# Patient Record
Sex: FEMALE | Race: ASIAN | Employment: FULL TIME | URBAN - METROPOLITAN AREA
[De-identification: names, ages, dates, MRNs, and addresses within clinical notes are randomized per-mention and may not be internally consistent; named-entity substitution may affect disease eponyms.]

---

## 2017-08-08 ENCOUNTER — OFFICE VISIT (OUTPATIENT)
Dept: INTERNAL MEDICINE CLINIC | Facility: CLINIC | Age: 27
End: 2017-08-08

## 2017-08-21 ENCOUNTER — OFFICE VISIT (OUTPATIENT)
Dept: INTERNAL MEDICINE CLINIC | Facility: CLINIC | Age: 27
End: 2017-08-21

## 2017-08-21 VITALS
SYSTOLIC BLOOD PRESSURE: 106 MMHG | TEMPERATURE: 97 F | HEART RATE: 53 BPM | WEIGHT: 155 LBS | RESPIRATION RATE: 18 BRPM | HEIGHT: 66 IN | BODY MASS INDEX: 24.91 KG/M2 | DIASTOLIC BLOOD PRESSURE: 59 MMHG

## 2017-08-21 DIAGNOSIS — Z11.3 SCREEN FOR STD (SEXUALLY TRANSMITTED DISEASE): ICD-10-CM

## 2017-08-21 DIAGNOSIS — Z00.00 ENCOUNTER FOR GENERAL ADULT MEDICAL EXAMINATION W/O ABNORMAL FINDINGS: Primary | ICD-10-CM

## 2017-08-21 NOTE — MR AVS SNAPSHOT
Visit Information Date & Time Provider Department Dept. Phone Encounter #  
 8/21/2017  8:30 AM Romario Rea NP [facility] Internal Medicine [phone] [id number] Follow-up Instructions Return for As needed. Upcoming Health Maintenance Date Due INFLUENZA AGE 9 TO ADULT 8/1/2017 PAP AKA CERVICAL CYTOLOGY 1/11/2019 DTaP/Tdap/Td series (3 - Td) 8/16/2026 Allergies as of 8/21/2017  Review Complete On: 8/21/2017 By: Romario Rea NP No Known Allergies Current Immunizations  Reviewed on 8/16/2016 Name Date HPV 7/14/2009 HPV (Quad) 8/16/2016 Tdap 8/16/2016, 7/14/2009 Not reviewed this visit You Were Diagnosed With   
  
 Codes Comments Encounter for general adult medical examination w/o abnormal findings    -  Primary ICD-10-CM: Z00.00 ICD-9-CM: V70.9 Screen for STD (sexually transmitted disease)     ICD-10-CM: Z11.3 ICD-9-CM: V74.5 Vitals BP Pulse Temp Resp Height(growth percentile) Weight(growth percentile) 106/59 (!) 53 97 °F (36.1 °C) (Oral) 18 5' 6\" (1.676 m) 155 lb (70.3 kg) LMP BMI OB Status Smoking Status 08/11/2017 (Approximate) 25.02 kg/m2 Implant Never Smoker Vitals History BMI and BSA Data Body Mass Index Body Surface Area 25.02 kg/m 2 1.81 m 2 Preferred Pharmacy Pharmacy Name Phone [facility] [id number] [place] [facility] [place] [address] [phone] Your Updated Medication List  
  
   
This list is accurate as of: 8/21/17  8:45 AM.  Always use your most recent med list.  
  
  
  
  
 cholecalciferol 1,000 unit tablet Commonly known as:  VITAMIN D3 Take 2 Tabs by mouth daily. Indications: VITAMIN D DEFICIENCY  
  
 etonogestrel 68 mg Impl  
by SubDERmal route. We Performed the Following CBC WITH AUTOMATED DIFF [34998 CPT(R)] CT/NG/T.VAGINALIS AMPLIFICATION [id number] CPT(R)] HIV 1/2 AG/AB, 4TH GENERATION,W RFLX CONFIRM [[id number] Custom] LIPID PANEL [44401 CPT(R)] METABOLIC PANEL, COMPREHENSIVE [82821 CPT(R)] RPR [56259 CPT(R)] VITAMIN D, 25 HYDROXY [id number] CPT(R)] Follow-up Instructions Return for As needed. Patient Instructions Use claritin,allegra, or zrytec to see if that helps your voice. You can also try Flonase or nasonex (nasal sprays) Hoarseness: Care Instructions Your Care Instructions Many things can cause your voice to become rough, raspy, or hard to hear. Having a cold or a sinus infection, talking too loudly or yelling, smoking, or breathing dry air can cause a hoarse voice. You also can have voice problems from pollution and allergies. Sometimes, acid from your stomach can back up into your throatcalled acid refluxand change your voice. In some cases, a problem with the voice box, or larynx, causes hoarseness. Rest and home care may be all you need if you have a hoarse voice. Follow-up care is a key part of your treatment and safety. Be sure to make and go to all appointments, and call your doctor if you are having problems. It's also a good idea to know your test results and keep a list of the medicines you take. How can you care for yourself at home? · Follow your doctor's advice about how much to talk. Use email or write notes when you can to rest your voice. · If your doctor prescribed antibiotics, take them as directed. Do not stop taking them just because you feel better. You need to take the full course of antibiotics. · Talk to your doctor about treatment for allergies if you do not already treat them. · Follow your treatment plan for acid reflux (if you have the condition): ¨ Take your medicines exactly as prescribed. Call your doctor if you think you are having a problem with your medicine. ¨ Limit or stop eating foods that make your acid reflux worse. These may include tomatoes, spicy foods, and chocolate. ¨ Limit or stop drinking alcohol and drinks that have caffeine, such as coffee, tea, and ronaldo. ¨ Raise the head of your bed a few inches. Place a brick or a foam wedge under the mattress at the head of the bed. This will help keep stomach acid out of your throat at night. · When you do talk, do not whisper. It can be hard on your voice. · Use a vaporizer or humidifier to add moisture to your bedroom. Follow the directions for cleaning the machine. · Drink plenty of water to keep your throat moist. If you have kidney, heart, or liver disease and have to limit fluids, talk with your doctor before you increase the amount of fluids you drink. · Do not smoke. Smoking can make your voice raspy and can increase your risk of throat cancer. If you need help quitting, talk to your doctor about stop-smoking programs and medicines. These can increase your chances of quitting for good. · To keep your voice from getting hoarse in the future, try not to talk loudly or shout, such as at sports events. When should you call for help? Call 911 anytime you think you may need emergency care. For example, call if: 
· You have trouble breathing. Call your doctor now or seek immediate medical care if: 
· You have trouble swallowing. · You have new or worse pain. Watch closely for changes in your health, and be sure to contact your doctor if: 
· You do not get better as expected. Where can you learn more? Go to http://juliocesar-alberto.info/. Enter P454 in the search box to learn more about \"Hoarseness: Care Instructions. \" Current as of: March 20, 2017 Content Version: 11.3 © 6724-8986 [employer]. Care instructions adapted under license by [employer] (which disclaims liability or warranty for this information).  If you have questions about a medical condition or this instruction, always ask your healthcare professional. [employer] disclaims any warranty or liability for your use of this information. Introducing [facility]! Dear Ric Dorsey: Thank you for requesting a [employer] account. Our records indicate that you already have an active [employer] account. You can access your account anytime at [email or web address]/[email or web address] Did you know that you can access your hospital and ER discharge instructions at any time in [employer]? You can also review all of your test results from your hospital stay or ER visit. Additional Information If you have questions, please visit the Frequently Asked Questions section of the [employer] website at [email or web address] Remember, [employer] is NOT to be used for urgent needs. For medical emergencies, dial 911. Now available from your iPhone and Android! Please provide this summary of care documentation to your next provider. Your primary care clinician is listed as Lonell . If you have any questions after today's visit, please call [phone].

## 2017-08-21 NOTE — PATIENT INSTRUCTIONS
Use claritin,allegra, or zrytec to see if that helps your voice. You can also try Flonase or nasonex (nasal sprays)     Hoarseness: Care Instructions  Your Care Instructions  Many things can cause your voice to become rough, raspy, or hard to hear. Having a cold or a sinus infection, talking too loudly or yelling, smoking, or breathing dry air can cause a hoarse voice. You also can have voice problems from pollution and allergies. Sometimes, acid from your stomach can back up into your throat--called acid reflux--and change your voice. In some cases, a problem with the voice box, or larynx, causes hoarseness. Rest and home care may be all you need if you have a hoarse voice. Follow-up care is a key part of your treatment and safety. Be sure to make and go to all appointments, and call your doctor if you are having problems. It's also a good idea to know your test results and keep a list of the medicines you take. How can you care for yourself at home? · Follow your doctor's advice about how much to talk. Use email or write notes when you can to rest your voice. · If your doctor prescribed antibiotics, take them as directed. Do not stop taking them just because you feel better. You need to take the full course of antibiotics. · Talk to your doctor about treatment for allergies if you do not already treat them. · Follow your treatment plan for acid reflux (if you have the condition): ¨ Take your medicines exactly as prescribed. Call your doctor if you think you are having a problem with your medicine. ¨ Limit or stop eating foods that make your acid reflux worse. These may include tomatoes, spicy foods, and chocolate. ¨ Limit or stop drinking alcohol and drinks that have caffeine, such as coffee, tea, and ronaldo. ¨ Raise the head of your bed a few inches. Place a brick or a foam wedge under the mattress at the head of the bed. This will help keep stomach acid out of your throat at night.   · When you do talk, do not whisper. It can be hard on your voice. · Use a vaporizer or humidifier to add moisture to your bedroom. Follow the directions for cleaning the machine. · Drink plenty of water to keep your throat moist. If you have kidney, heart, or liver disease and have to limit fluids, talk with your doctor before you increase the amount of fluids you drink. · Do not smoke. Smoking can make your voice raspy and can increase your risk of throat cancer. If you need help quitting, talk to your doctor about stop-smoking programs and medicines. These can increase your chances of quitting for good. · To keep your voice from getting hoarse in the future, try not to talk loudly or shout, such as at sports events. When should you call for help? Call 911 anytime you think you may need emergency care. For example, call if:  · You have trouble breathing. Call your doctor now or seek immediate medical care if:  · You have trouble swallowing. · You have new or worse pain. Watch closely for changes in your health, and be sure to contact your doctor if:  · You do not get better as expected. Where can you learn more? Go to http://juliocesar-alberto.info/. Enter P454 in the search box to learn more about \"Hoarseness: Care Instructions. \"  Current as of: March 20, 2017  Content Version: 11.3  © 3128-6504 [employer]. Care instructions adapted under license by [employer] (which disclaims liability or warranty for this information). If you have questions about a medical condition or this instruction, always ask your healthcare professional. [place] [address] any warranty or liability for your use of this information.

## 2017-08-21 NOTE — PROGRESS NOTES
Subjective:      Hanna Owens is a 23 y.o. female who presents today for CPE with fasting labs. Health Maintenance  Immunizations:    Influenza: up to date. Tetanus: up to date. Gardasil: up to date. Cancer screening:    Cervical: reviewed guidelines, UTD. Breast: reviewed guidelines, UTD, done by OBGYN, records requested    Patient Care Team:  Ghislaine Campa NP as PCP - General (Nurse Practitioner)       The following sections were reviewed & updated as appropriate: PMH, PSH, FH, and SH. Patient Active Problem List   Diagnosis Code    Implanon in place Z97.5    Vitamin D deficiency E55.9    Elevated platelet count ([facility] [address] D47.3      Prior to Admission medications    Medication Sig Start Date End Date Taking? Authorizing Provider   etonogestrel 68 mg impl by SubDERmal route. Yes Historical Provider   cholecalciferol (VITAMIN D3) 1,000 unit tablet Take 2 Tabs by mouth daily. Indications: VITAMIN D DEFICIENCY 8/22/16   Ghislaine Campa NP      No Known Allergies     Family History   Problem Relation Age of Onset    Hypertension Mother      Social History   Substance Use Topics    Smoking status: Never Smoker    Smokeless tobacco: Never Used    Alcohol use 2.4 oz/week     4 Cans of beer per week      Comment: weekly        Review of Systems    A comprehensive review of systems was negative except for that written in the HPI. Objective:     Visit Vitals    Ht 5' 6\" (1.676 m)    Wt 155 lb (70.3 kg)    LMP 08/11/2017 (Approximate)    BMI 25.02 kg/m2     General:  Alert, cooperative, no distress, appears stated age. Head:  Normocephalic, without obvious abnormality, atraumatic. Eyes:  Conjunctivae/corneas clear. PERRL, EOMs intact. Fundi benign. Ears:  Normal TMs and external ear canals both ears. Nose: Nares normal. Septum midline. Mucosa normal. No drainage or sinus tenderness.    Throat: Lips, mucosa, and tongue normal. Teeth and gums normal.   Neck: Supple, symmetrical, trachea midline, no adenopathy, thyroid: no enlargement/tenderness/nodules, no carotid bruit and no JVD. Back:   Symmetric, no curvature. ROM normal. No CVA tenderness. Lungs:   Clear to auscultation bilaterally. Chest wall:  No tenderness or deformity. Heart:  Regular rate and rhythm, S1, S2 normal, no murmur, click, rub or gallop. Abdomen:   Soft, non-tender. Bowel sounds normal. No masses,  No organomegaly. Extremities: Extremities normal, atraumatic, no cyanosis or edema. Pulses: 2+ and symmetric all extremities. Skin: Skin color, texture, turgor normal. No rashes or lesions. Lymph nodes: Cervical, supraclavicular, and axillary nodes normal.   Neurologic: CNII-XII intact. Normal strength, sensation and reflexes throughout. Nursing note and vitals reviewed  Assessment/Plan:       ICD-10-CM ICD-9-CM    1. Encounter for general adult medical examination w/o abnormal findings Z00.00 V70.9 CBC WITH AUTOMATED DIFF      LIPID PANEL      METABOLIC PANEL, COMPREHENSIVE      VITAMIN D, 25 HYDROXY   2. Screen for STD (sexually transmitted disease) Z11.3 V74.5 HIV 1/2 AG/AB, 4TH GENERATION,W RFLX CONFIRM      RPR      CT/NG/T.VAGINALIS AMPLIFICATION     Follow-up Disposition:  Return for As needed. Advised her to call back or return to office if symptoms worsen/change/persist.  Discussed expected course/resolution/complications of diagnosis in detail with patient. Medication risks/benefits/costs/interactions/alternatives discussed with patient. She was given an after visit summary which includes diagnoses, current medications, & vitals. She expressed understanding with the diagnosis and plan.

## 2017-08-21 NOTE — PROGRESS NOTES
Chief Complaint   Patient presents with    Physical     1. Have you been to the ER, urgent care clinic since your last visit? Hospitalized since your last visit? No    2. Have you seen or consulted any other health care providers outside of the [address] [place] [address] since your last visit? Include any pap smears or colon screening.  No

## 2017-08-23 DIAGNOSIS — E55.9 VITAMIN D DEFICIENCY: Primary | ICD-10-CM

## 2017-08-23 LAB
25(OH)D3+25(OH)D2 SERPL-MCNC: 21.4 NG/ML (ref 30–100)
ALBUMIN SERPL-MCNC: 4.4 G/DL (ref 3.5–5.5)
ALBUMIN/GLOB SERPL: 1.7 {RATIO} (ref 1.2–2.2)
ALP SERPL-CCNC: 43 IU/L (ref 39–117)
ALT SERPL-CCNC: 9 IU/L (ref 0–32)
AST SERPL-CCNC: 15 IU/L (ref 0–40)
BASOPHILS # BLD AUTO: 0 X10E3/UL (ref 0–0.2)
BASOPHILS NFR BLD AUTO: 1 %
BILIRUB SERPL-MCNC: 0.8 MG/DL (ref 0–1.2)
BUN SERPL-MCNC: 12 MG/DL (ref 6–20)
BUN/CREAT SERPL: 18 (ref 9–23)
C TRACH RRNA SPEC QL NAA+PROBE: NEGATIVE
CALCIUM SERPL-MCNC: 9.5 MG/DL (ref 8.7–10.2)
CHLORIDE SERPL-SCNC: 100 MMOL/L (ref 96–106)
CHOLEST SERPL-MCNC: 159 MG/DL (ref 100–199)
CO2 SERPL-SCNC: 25 MMOL/L (ref 18–29)
CREAT SERPL-MCNC: 0.67 MG/DL (ref 0.57–1)
EOSINOPHIL # BLD AUTO: 0.4 X10E3/UL (ref 0–0.4)
EOSINOPHIL NFR BLD AUTO: 8 %
ERYTHROCYTE [DISTWIDTH] IN BLOOD BY AUTOMATED COUNT: 13.1 % (ref 12.3–15.4)
GLOBULIN SER CALC-MCNC: 2.6 G/DL (ref 1.5–4.5)
GLUCOSE SERPL-MCNC: 87 MG/DL (ref 65–99)
HCT VFR BLD AUTO: 44.7 % (ref 34–46.6)
HDLC SERPL-MCNC: 82 MG/DL
HGB BLD-MCNC: 14.4 G/DL (ref 11.1–15.9)
HIV 1+2 AB+HIV1 P24 AG SERPL QL IA: NON REACTIVE
IMM GRANULOCYTES # BLD: 0 X10E3/UL (ref 0–0.1)
IMM GRANULOCYTES NFR BLD: 0 %
LDLC SERPL CALC-MCNC: 59 MG/DL (ref 0–99)
LYMPHOCYTES # BLD AUTO: 2 X10E3/UL (ref 0.7–3.1)
LYMPHOCYTES NFR BLD AUTO: 41 %
MCH RBC QN AUTO: 29.7 PG (ref 26.6–33)
MCHC RBC AUTO-ENTMCNC: 32.2 G/DL (ref 31.5–35.7)
MCV RBC AUTO: 92 FL (ref 79–97)
MONOCYTES # BLD AUTO: 0.4 X10E3/UL (ref 0.1–0.9)
MONOCYTES NFR BLD AUTO: 9 %
N GONORRHOEA RRNA SPEC QL NAA+PROBE: NEGATIVE
NEUTROPHILS # BLD AUTO: 2 X10E3/UL (ref 1.4–7)
NEUTROPHILS NFR BLD AUTO: 41 %
PLATELET # BLD AUTO: 381 X10E3/UL (ref 150–379)
POTASSIUM SERPL-SCNC: 4.9 MMOL/L (ref 3.5–5.2)
PROT SERPL-MCNC: 7 G/DL (ref 6–8.5)
RBC # BLD AUTO: 4.85 X10E6/UL (ref 3.77–5.28)
RPR SER QL: NON REACTIVE
SODIUM SERPL-SCNC: 140 MMOL/L (ref 134–144)
T VAGINALIS RRNA SPEC QL NAA+PROBE: NEGATIVE
TRIGL SERPL-MCNC: 90 MG/DL (ref 0–149)
VLDLC SERPL CALC-MCNC: 18 MG/DL (ref 5–40)
WBC # BLD AUTO: 4.9 X10E3/UL (ref 3.4–10.8)

## 2017-08-23 RX ORDER — CHOLECALCIFEROL TAB 125 MCG (5000 UNIT) 125 MCG
5000 TAB ORAL DAILY
Qty: 30 TAB | Refills: 11 | Status: SHIPPED | OUTPATIENT
Start: 2017-08-23

## 2017-12-11 ENCOUNTER — OFFICE VISIT (OUTPATIENT)
Dept: INTERNAL MEDICINE CLINIC | Facility: CLINIC | Age: 27
End: 2017-12-11

## 2017-12-11 VITALS
TEMPERATURE: 98 F | SYSTOLIC BLOOD PRESSURE: 98 MMHG | HEIGHT: 66 IN | DIASTOLIC BLOOD PRESSURE: 66 MMHG | OXYGEN SATURATION: 100 % | HEART RATE: 68 BPM | WEIGHT: 161 LBS | BODY MASS INDEX: 25.88 KG/M2 | RESPIRATION RATE: 16 BRPM

## 2017-12-11 DIAGNOSIS — R41.840 CONCENTRATION DEFICIT: Primary | ICD-10-CM

## 2017-12-11 RX ORDER — MISOPROSTOL 200 UG/1
TABLET ORAL
Refills: 0 | COMMUNITY
Start: 2017-10-26 | End: 2018-06-25 | Stop reason: ALTCHOICE

## 2017-12-11 RX ORDER — BUPROPION HYDROCHLORIDE 150 MG/1
150 TABLET ORAL
Qty: 30 TAB | Refills: 0 | Status: SHIPPED | OUTPATIENT
Start: 2017-12-11 | End: 2018-01-07 | Stop reason: SDUPTHER

## 2017-12-11 NOTE — MR AVS SNAPSHOT
Visit Information Date & Time Provider Department Dept. Phone Encounter #  
 12/11/2017  4:00 PM Tess Peter, [place] [address] Internal Medicine [phone] [id number] Follow-up Instructions Return in about 4 weeks (around 1/8/2018) for concentration deficit, follow up in 4 weeks if needed. Upcoming Health Maintenance Date Due  
 PAP AKA CERVICAL CYTOLOGY 1/11/2019 DTaP/Tdap/Td series (3 - Td) 8/16/2026 Allergies as of 12/11/2017  Review Complete On: 12/11/2017 By: Tess Peter NP No Known Allergies Current Immunizations  Reviewed on 8/16/2016 Name Date HPV 7/14/2009 HPV (Quad) 8/16/2016 Influenza Vaccine 10/26/2017 Tdap 8/16/2016, 7/14/2009 Not reviewed this visit You Were Diagnosed With   
  
 Codes Comments Concentration deficit    -  Primary ICD-10-CM: R41.840 ICD-9-CM: 799.51 Vitals BP Pulse Temp Resp Height(growth percentile) Weight(growth percentile) 98/66 (BP 1 Location: Left arm, BP Patient Position: Sitting) 68 98 °F (36.7 °C) (Oral) 16 5' 6\" (1.676 m) 161 lb (73 kg) LMP SpO2 BMI OB Status Smoking Status 11/15/2017 100% 25.99 kg/m2 IUD Never Smoker Vitals History BMI and BSA Data Body Mass Index Body Surface Area  
 25.99 kg/m 2 1.84 m 2 Preferred Pharmacy Pharmacy Name Phone [id number] - [place]en Touss[place], [address] [facility] [phone] Your Updated Medication List  
  
   
This list is accurate as of: 12/11/17  4:16 PM.  Always use your most recent med list.  
  
  
  
  
 buPROPion  mg tablet Commonly known as:  [place] Take 1 Tab by mouth every morning. cholecalciferol (VITAMIN D3) 5,000 unit Tab tablet Commonly known as:  VITAMIN D3 Take 1 Tab by mouth daily. miSOPROStol 200 mcg tablet Commonly known as:  CYTOTEC PLACE ONE TAB VAGINALLY THE NIGHT PRIOR TO SURGERY Prescriptions Sent to Pharmacy Refills buPROPion XL (WELLBUTRIN XL) 150 mg tablet 0 Sig: Take 1 Tab by mouth every morning. Class: Normal  
 Pharmacy: [facility] [address] [address] #: [phone] Route: Oral  
  
We Performed the Following REFERRAL TO NEUROPSYCHOLOGY [AWO37 Custom] Comments:  
 Please evaluate patient for concentration deficit. Follow-up Instructions Return in about 4 weeks (around 1/8/2018) for concentration deficit, follow up in 4 weeks if needed. Referral Information Referral ID Referred By Referred To  
  
 [id number] [facility], [address] [facility] [place] [address] [facility], [address] [facility] Phone: [phone] Fax: [phone] Visits Status Start Date End Date 1 New Request 12/11/17 12/11/18 If your referral has a status of pending review or denied, additional information will be sent to support the outcome of this decision. Patient Instructions Bupropion (By mouth) Bupropion (eok-OHKG-wti-on) Treats depression and aids in quitting smoking. Also prevents depression caused by seasonal affective disorder (SAD). Brand Name(s): Aplenzin, Forfivo XL, Wellbutrin, Wellbutrin SR, Wellbutrin XL, Zyban There may be other brand names for this medicine. When This Medicine Should Not Be Used: This medicine is not right for everyone. Do not use it if you had an allergic reaction to bupropion, or if you have seizures, anorexia, or bulimia. How to Use This Medicine:  
Tablet, Long Acting Tablet · Take your medicine as directed. Your dose may need to be changed several times to find what works best for you. · You may need to take Wellbutrin® for up to 4 weeks before you feel better. You may need to take Zyban® for 1 to 2 weeks before the date that you plan to stop smoking. · Swallow the extended-release tablet whole. Do not crush, break, or chew it. · It is best to take Aplenzin® in the morning. · Do not take Wellbutrin® or Zyban® close to bedtime if you have trouble sleeping. · Take it with food if it upsets your stomach or if you have nausea. · If you take the extended-release tablet, part of the tablet may pass into your stools. This is normal and is nothing to worry about. · This medicine should come with a Medication Guide. Ask your pharmacist for a copy if you do not have one. · Missed dose: Skip the missed dose and go back to your regular dosing schedule. Never take extra medicine to make up for a missed dose. · Store the medicine in a closed container at room temperature, away from heat, moisture, and direct light. Drugs and Foods to Avoid: Ask your doctor or pharmacist before using any other medicine, including over-the-counter medicines, vitamins, and herbal products. · Do not use this medicine and an MAO inhibitor (MAOI) within 14 days of each other. Do not use Zyban® to quit smoking if you already take Aplenzin® or Wellbutrin® for depression, because they are the same medicine. · Tell your doctor if you take barbiturates, benzodiazepines, antiseizure medicine, or sedatives, or if you recently stopped taking them. · Some medicines can affect how bupropion works. Tell your doctor if you use any of the following: ¨ Amantadine, carbamazepine, cimetidine, clopidogrel, cyclophosphamide, digoxin, efavirenz, levodopa, lopinavir, nelfinavir, nicotine patch, orphenadrine, phenobarbital, phenytoin, ritonavir, tamoxifen, theophylline, thiotepa, ticlopidine ¨ Beta blocker medicine (including metoprolol) ¨ A blood thinner (including warfarin) ¨ Insulin or diabetes medicine ¨ Medicine to treat depression (including desipramine, fluoxetine, imipramine, nortriptyline, paroxetine, sertraline, venlafaxine) ¨ Medicine to treat heart rhythm problems (including flecainide, propafenone) ¨ Medicine to treat mental illness (including haloperidol, risperidone, thioridazine) ¨ Steroid medicine (including dexamethasone, hydrocortisone, methylprednisolone, prednisolone, prednisone) · Do not drink alcohol while you are using this medicine. · Tell your doctor if you use anything else that makes you sleepy. Some examples are allergy medicine, narcotic pain medicine, and alcohol. Warnings While Using This Medicine: · Tell your doctor if you are pregnant or breastfeeding, or if you have kidney disease, liver disease, heart disease, diabetes, glaucoma, mental illness (including bipolar disorder), or high blood pressure. Tell your doctor if you have a history of drug addiction or if you drink alcohol. · For some children, teenagers, and young adults, this medicine may increase mental or emotional problems. This may lead to thoughts of suicide and violence. Talk with your doctor right away if you have any thoughts or behavior changes that concern you. Tell your doctor if you or anyone in your family has a history of bipolar disorder or suicide attempts. · This medicine may cause the following problems: 
¨ Increased risk of seizures ¨ Changes in mood or behavior ¨ High blood pressure ¨ Serious skin reactions · This medicine may make you dizzy or drowsy. Do not drive or do anything that could be dangerous until you know how this medicine affects you. · Zyban® is only part of a complete program to help you quit smoking. You may still want to smoke at times. Have a plan to cope with these situations. · Do not stop using this medicine suddenly. Your doctor will need to slowly decrease your dose before you stop it completely. · Tell any doctor or dentist who treats you that you are using this medicine. This medicine may affect certain medical test results. · Your doctor will check your progress and the effects of this medicine at regular visits. Keep all appointments. · Keep all medicine out of the reach of children. Never share your medicine with anyone. Possible Side Effects While Using This Medicine:  
Call your doctor right away if you notice any of these side effects: · Allergic reaction: Itching or hives, swelling in your face or hands, swelling or tingling in your mouth or throat, chest tightness, trouble breathing · Blistering, peeling, red skin rash · Chest pain, trouble breathing, fast, slow, or uneven heartbeat · Eye pain, vision changes, seeing halos around lights · Muscle or joint pain, fever with rash · Seeing or hearing things that are not there, feeling like people are against you · Seizures · Sudden increase in energy, racing thoughts, trouble sleeping · Thoughts of hurting yourself, worsening depression, severe agitation or confusion If you notice these less serious side effects, talk with your doctor: · Dry mouth 
· Headache, dizziness · Nausea, vomiting, constipation, diarrhea, gas, stomach pain · Weight gain or loss If you notice other side effects that you think are caused by this medicine, tell your doctor. Call your doctor for medical advice about side effects. You may report side effects to FDA at 4-258-FDA-6131 © 2017 [address] Information is for End User's use only and may not be sold, redistributed or otherwise used for commercial purposes. The above information is an  only. It is not intended as medical advice for individual conditions or treatments. Talk to your doctor, nurse or pharmacist before following any medical regimen to see if it is safe and effective for you. Introducing [facility]! Dear Felix Leon: Thank you for requesting a [employer] account. Our records indicate that you already have an active [employer] account. You can access your account anytime at [email or web address]/"[email or web address]" Did you know that you can access your hospital and ER discharge instructions at any time in [employer]? You can also review all of your test results from your hospital stay or ER visit. Additional Information If you have questions, please visit the Frequently Asked Questions section of the [employer] website at [email or web address] Remember, [employer] is NOT to be used for urgent needs. For medical emergencies, dial 911. Now available from your iPhone and Android! Please provide this summary of care documentation to your next provider. Your primary care clinician is listed as Stanley Hobbs. If you have any questions after today's visit, please call [phone].

## 2017-12-11 NOTE — PROGRESS NOTES
Subjective:      Lubna Ray is a 33 y.o. female who presents today for concentration concern. Mental Health Review  Patient states she is having concentration issues. Since last visit: she states that his has been an issue from high school [employer], she states she procrastinates, doesn't finish tasks, she states she has trouble starting things. Ongoing symptoms include: feelings of losing control, difficulty concentrating. She denies: SI/SA, insomnia, panic attacks, anxiety attacks. Patient Active Problem List    Diagnosis Date Noted    Vitamin D deficiency 08/22/2016    Elevated platelet count ([facility] [address] 08/22/2016    Implanon in place 07/15/2016     Current Outpatient Prescriptions   Medication Sig Dispense Refill    cholecalciferol, VITAMIN D3, (VITAMIN D3) 5,000 unit tab tablet Take 1 Tab by mouth daily. 30 Tab 11    etonogestrel 68 mg impl by SubDERmal route. No Known Allergies  No past medical history on file. Family History   Problem Relation Age of Onset    Hypertension Mother      Social History   Substance Use Topics    Smoking status: Never Smoker    Smokeless tobacco: Never Used    Alcohol use 2.4 oz/week     4 Cans of beer per week      Comment: weekly        Review of Systems    A comprehensive review of systems was negative except for that written in the HPI.      Objective:     Visit Vitals    BP 98/66 (BP 1 Location: Left arm, BP Patient Position: Sitting)    Pulse 68    Temp 98 °F (36.7 °C) (Oral)    Resp 16    Ht 5' 6\" (1.676 m)    Wt 161 lb (73 kg)    LMP 11/15/2017    SpO2 100%    BMI 25.99 kg/m2     General appearance: alert, cooperative, no distress, appears stated age  Head: Normocephalic, without obvious abnormality, atraumatic  Eyes: negative  Lungs: clear to auscultation bilaterally  Heart: regular rate and rhythm, S1, S2 normal, no murmur, click, rub or gallop  Extremities: extremities normal, atraumatic, no cyanosis or edema  Neurologic: Grossly normal  Nursing note and vitals reviewed  Assessment/Plan:       ICD-10-CM ICD-9-CM    1. Concentration deficit R41.840        buPROPion XL (WELLBUTRIN XL) 150 mg tablet      REFERRAL TO NEUROPSYCHOLOGY   She will trial wellbutrin, if no improvement she will see neuropsych. Follow-up Disposition:  Return in about 4 weeks (around 1/8/2018) for concentration deficit, follow up in 4 weeks if needed. Advised her to call back or return to office if symptoms worsen/change/persist.  Discussed expected course/resolution/complications of diagnosis in detail with patient. Medication risks/benefits/costs/interactions/alternatives discussed with patient. She was given an after visit summary which includes diagnoses, current medications, & vitals. She expressed understanding with the diagnosis and plan.

## 2017-12-11 NOTE — PATIENT INSTRUCTIONS
Bupropion (By mouth)   Bupropion (tfd-OMTQ-tkr-on)  Treats depression and aids in quitting smoking. Also prevents depression caused by seasonal affective disorder (SAD). Brand Name(s): Aplenzin, Forfivo XL, Wellbutrin, Wellbutrin SR, Wellbutrin XL, Zyban   There may be other brand names for this medicine. When This Medicine Should Not Be Used: This medicine is not right for everyone. Do not use it if you had an allergic reaction to bupropion, or if you have seizures, anorexia, or bulimia. How to Use This Medicine:   Tablet, Long Acting Tablet  · Take your medicine as directed. Your dose may need to be changed several times to find what works best for you. · You may need to take Wellbutrin® for up to 4 weeks before you feel better. You may need to take Zyban® for 1 to 2 weeks before the date that you plan to stop smoking. · Swallow the extended-release tablet whole. Do not crush, break, or chew it. · It is best to take Aplenzin® in the morning. · Do not take Wellbutrin® or Zyban® close to bedtime if you have trouble sleeping. · Take it with food if it upsets your stomach or if you have nausea. · If you take the extended-release tablet, part of the tablet may pass into your stools. This is normal and is nothing to worry about. · This medicine should come with a Medication Guide. Ask your pharmacist for a copy if you do not have one. · Missed dose: Skip the missed dose and go back to your regular dosing schedule. Never take extra medicine to make up for a missed dose. · Store the medicine in a closed container at room temperature, away from heat, moisture, and direct light. Drugs and Foods to Avoid:   Ask your doctor or pharmacist before using any other medicine, including over-the-counter medicines, vitamins, and herbal products. · Do not use this medicine and an MAO inhibitor (MAOI) within 14 days of each other.  Do not use Zyban® to quit smoking if you already take Aplenzin® or Wellbutrin® for depression, because they are the same medicine. · Tell your doctor if you take barbiturates, benzodiazepines, antiseizure medicine, or sedatives, or if you recently stopped taking them. · Some medicines can affect how bupropion works. Tell your doctor if you use any of the following:   ¨ Amantadine, carbamazepine, cimetidine, clopidogrel, cyclophosphamide, digoxin, efavirenz, levodopa, lopinavir, nelfinavir, nicotine patch, orphenadrine, phenobarbital, phenytoin, ritonavir, tamoxifen, theophylline, thiotepa, ticlopidine  ¨ Beta blocker medicine (including metoprolol)  ¨ A blood thinner (including warfarin)  ¨ Insulin or diabetes medicine  ¨ Medicine to treat depression (including desipramine, fluoxetine, imipramine, nortriptyline, paroxetine, sertraline, venlafaxine)  ¨ Medicine to treat heart rhythm problems (including flecainide, propafenone)  ¨ Medicine to treat mental illness (including haloperidol, risperidone, thioridazine)  ¨ Steroid medicine (including dexamethasone, hydrocortisone, methylprednisolone, prednisolone, prednisone)  · Do not drink alcohol while you are using this medicine. · Tell your doctor if you use anything else that makes you sleepy. Some examples are allergy medicine, narcotic pain medicine, and alcohol. Warnings While Using This Medicine:   · Tell your doctor if you are pregnant or breastfeeding, or if you have kidney disease, liver disease, heart disease, diabetes, glaucoma, mental illness (including bipolar disorder), or high blood pressure. Tell your doctor if you have a history of drug addiction or if you drink alcohol. · For some children, teenagers, and young adults, this medicine may increase mental or emotional problems. This may lead to thoughts of suicide and violence. Talk with your doctor right away if you have any thoughts or behavior changes that concern you. Tell your doctor if you or anyone in your family has a history of bipolar disorder or suicide attempts.   · This medicine may cause the following problems:  ¨ Increased risk of seizures  ¨ Changes in mood or behavior  ¨ High blood pressure  ¨ Serious skin reactions  · This medicine may make you dizzy or drowsy. Do not drive or do anything that could be dangerous until you know how this medicine affects you. · Zyban® is only part of a complete program to help you quit smoking. You may still want to smoke at times. Have a plan to cope with these situations. · Do not stop using this medicine suddenly. Your doctor will need to slowly decrease your dose before you stop it completely. · Tell any doctor or dentist who treats you that you are using this medicine. This medicine may affect certain medical test results. · Your doctor will check your progress and the effects of this medicine at regular visits. Keep all appointments. · Keep all medicine out of the reach of children. Never share your medicine with anyone. Possible Side Effects While Using This Medicine:   Call your doctor right away if you notice any of these side effects:  · Allergic reaction: Itching or hives, swelling in your face or hands, swelling or tingling in your mouth or throat, chest tightness, trouble breathing  · Blistering, peeling, red skin rash  · Chest pain, trouble breathing, fast, slow, or uneven heartbeat  · Eye pain, vision changes, seeing halos around lights  · Muscle or joint pain, fever with rash  · Seeing or hearing things that are not there, feeling like people are against you  · Seizures  · Sudden increase in energy, racing thoughts, trouble sleeping  · Thoughts of hurting yourself, worsening depression, severe agitation or confusion  If you notice these less serious side effects, talk with your doctor:   · Dry mouth  · Headache, dizziness  · Nausea, vomiting, constipation, diarrhea, gas, stomach pain  · Weight gain or loss  If you notice other side effects that you think are caused by this medicine, tell your doctor.    Call your doctor for medical advice about side effects. You may report side effects to FDA at 7-452-FDA-9019  © 2017 [address]  Information is for End User's use only and may not be sold, redistributed or otherwise used for commercial purposes. The above information is an  only. It is not intended as medical advice for individual conditions or treatments. Talk to your doctor, nurse or pharmacist before following any medical regimen to see if it is safe and effective for you.

## 2017-12-11 NOTE — PROGRESS NOTES
Chief Complaint   Patient presents with    Anxiety     Trouble focusing on daily activities and work. Unable to start task when due. 1. Have you been to the ER, urgent care clinic since your last visit? Hospitalized since your last visit? NO    2. Have you seen or consulted any other health care providers outside of the [address] [place] [address] since your last visit? Include any pap smears or colon screening.  NO

## 2018-01-07 DIAGNOSIS — R41.840 CONCENTRATION DEFICIT: ICD-10-CM

## 2018-01-08 RX ORDER — BUPROPION HYDROCHLORIDE 150 MG/1
TABLET ORAL
Qty: 30 TAB | Refills: 0 | Status: SHIPPED | OUTPATIENT
Start: 2018-01-08 | End: 2018-01-10 | Stop reason: SINTOL

## 2018-01-26 DIAGNOSIS — R41.840 CONCENTRATION DEFICIT: Primary | ICD-10-CM

## 2018-01-26 RX ORDER — FLUOXETINE 20 MG/1
20 TABLET ORAL DAILY
Qty: 30 TAB | Refills: 2 | Status: SHIPPED | OUTPATIENT
Start: 2018-01-26 | End: 2018-06-25 | Stop reason: ALTCHOICE

## 2018-03-12 ENCOUNTER — OFFICE VISIT (OUTPATIENT)
Dept: INTERNAL MEDICINE CLINIC | Age: 28
End: 2018-03-12

## 2018-03-12 VITALS
DIASTOLIC BLOOD PRESSURE: 70 MMHG | RESPIRATION RATE: 17 BRPM | SYSTOLIC BLOOD PRESSURE: 116 MMHG | OXYGEN SATURATION: 97 % | BODY MASS INDEX: 24.78 KG/M2 | TEMPERATURE: 98.4 F | WEIGHT: 154.2 LBS | HEIGHT: 66 IN | HEART RATE: 54 BPM

## 2018-03-12 DIAGNOSIS — M54.50 CHRONIC RIGHT-SIDED LOW BACK PAIN WITHOUT SCIATICA: ICD-10-CM

## 2018-03-12 DIAGNOSIS — M62.838 NECK MUSCLE SPASM: ICD-10-CM

## 2018-03-12 DIAGNOSIS — G89.29 CHRONIC RIGHT-SIDED LOW BACK PAIN WITHOUT SCIATICA: ICD-10-CM

## 2018-03-12 DIAGNOSIS — M54.2 BILATERAL NECK PAIN: Primary | ICD-10-CM

## 2018-03-12 NOTE — PROGRESS NOTES
Chief Complaint   Patient presents with    Neck Pain    Back Pain     she is a 33y.o. year old female who presents for evaluation of neck and back pain  Pain Assessment Encounter      Anthony John  3/12/2018  Onset of Symptoms: Several Months  ________________________________________________________________________  Description:Patient feels like a needle is being put in her neck    Frequency: more than 5 times a day  Pain Scale:(1-10): 8  Trauma Hx: None  Hx of similar symptoms: No  Radiation: None  Duration:  intermittent      Progression: is unchanged  What makes it better?: None  What makes it worse?:Posture  Medications tried: None    Reviewed and agree with Nurse Note and duplicated in this note. Reviewed PmHx, RxHx, FmHx, SocHx, AllgHx and updated and dated in the chart. Family History   Problem Relation Age of Onset    Hypertension Mother      No past medical history on file.    Social History     Social History    Marital status: SINGLE     Spouse name: N/A    Number of children: N/A    Years of education: N/A     Social History Main Topics    Smoking status: Never Smoker    Smokeless tobacco: Never Used    Alcohol use 2.4 oz/week     4 Cans of beer per week      Comment: weekly    Drug use: No    Sexual activity: Not Currently     Birth control/ protection: Implant     Other Topics Concern    None     Social History Narrative        Review of Systems - negative except as listed above      Objective:     Vitals:    03/12/18 1517   Weight: 154 lb 3.2 oz (69.9 kg)   Height: 5' 6\" (1.676 m)       Physical Examination: General appearance - alert, well appearing, and in no distress  Back exam - full range of motion, no tenderness, palpable spasm or pain on motion, negative straight-leg raise bilaterally   Neurological - alert, oriented, normal speech, no focal findings or movement disorder noted  Musculoskeletal - Neck -Spurling's negative bilaterally, pain with palpation of bilateral traps  Extremities - peripheral pulses normal, no pedal edema, no clubbing or cyanosis  Skin - normal coloration and turgor, no rashes, no suspicious skin lesions noted    Assessment/ Plan:   Diagnoses and all orders for this visit:    1. Bilateral neck pain  -     REFERRAL TO PHYSICAL THERAPY  -     XR SPINE CERV 4 OR 5 V; Future    2. Chronic right-sided low back pain without sciatica  -     XR SPINE LUMB 2 OR 3 V; Future  -     REFERRAL TO PHYSICAL THERAPY    3. Neck muscle spasm    Had a given on proper ergonomics    Pathophysiology, recovery and rehabilitation process discussed and questions answered   Counseling for 30 Minutes of the total visit duration   Pictures and figures used as necessary   Provided reassurance   Monitor response to Physical Therapy   Recommend activity modification   Recommend  lower impact activities-walking, Eliptical, Nordic Track, cycling or swimming   Follow up in 4 week(s)  Xray's reviewed - within normal limits     :      1) Remember to stay active and/or exercise regularly (I suggest 30-45 minutes daily)   2) For reliable dietary information, go to www. [employer]AT[employer].org. You may wish to consider seeing the nutritionist at [facility] [phone], also consider the [address] I have discussed the diagnosis with the patient and the intended plan as seen in the above orders. The patient has received an after-visit summary and questions were answered concerning future plans. Medication Side Effects and Warnings were discussed with patient: yes  Patient Labs were reviewed and or requested: yes  Patient Past Records were reviewed and or requested  yes  I have discussed the diagnosis with the patient and the intended plan as seen in the above orders. The patient has received an after-visit summary and questions were answered concerning future plans. Pt agrees to call or return to clinic and/or go to closest ER with any worsening of symptoms.   This may include, but not limited to increased fever (>100.4) with NSAIDS or Tylenol, increased edema, confusion, rash, worsening of presenting symptoms.

## 2018-03-12 NOTE — MR AVS SNAPSHOT
Monserrat Chong 
 
 
 . [address] [id number] 
[phone] Patient: Shawna Alex MRN: [id number] OXR:6/88/5723 Visit Information Date & Time Provider Department Dept. Phone Encounter #  
 3/12/2018  3:00 PM [facility] MD Mary Ellen [facility] [phone] [id number] Follow-up Instructions Return if symptoms worsen or fail to improve. Follow-up and Disposition History Your Appointments 5/24/2018  9:20 AM  
New Patient with Jan Dalton PsyD 1990 [facility] ([facility]) Appt Note: NP concentration deficit [facility] JAY JAY 12/19/17 [place] [address] [facility] [id number] [phone]  
  
   
 [place] [address] [id number] [address] Upcoming Health Maintenance Date Due  
 PAP AKA CERVICAL CYTOLOGY 1/11/2019 DTaP/Tdap/Td series (3 - Td) 8/16/2026 Allergies as of 3/12/2018  Review Complete On: 3/12/2018 By: [facility] MD Mary Ellen  
 No Known Allergies Current Immunizations  Reviewed on 8/16/2016 Name Date HPV 7/14/2009 HPV (Quad) 8/16/2016 Influenza Vaccine 10/26/2017 Tdap 8/16/2016, 7/14/2009 Not reviewed this visit You Were Diagnosed With   
  
 Codes Comments Bilateral neck pain    -  Primary ICD-10-CM: M54.2 ICD-9-CM: 723.1 Chronic right-sided low back pain without sciatica     ICD-10-CM: M54.5, G89.29 ICD-9-CM: 724.2, 338.29 Neck muscle spasm     ICD-10-CM: R36.196 ICD-9-CM: 728.85 Vitals BP Pulse Temp Resp Height(growth percentile) Weight(growth percentile) 116/70 (BP 1 Location: Left arm, BP Patient Position: Sitting) (!) 54 98.4 °F (36.9 °C) (Oral) 17 5' 6\" (1.676 m) 154 lb 3.2 oz (69.9 kg) SpO2 BMI OB Status Smoking Status 97% 24.89 kg/m2 IUD Never Smoker Vitals History BMI and BSA Data Body Mass Index Body Surface Area 24.89 kg/m 2 1.8 m 2 Preferred Pharmacy Pharmacy Name Phone [phone] [place], [address] [facility] [phone] Your Updated Medication List  
  
   
This list is accurate as of 3/12/18  3:54 PM.  Always use your most recent med list.  
  
  
  
  
 cholecalciferol (VITAMIN D3) 5,000 unit Tab tablet Commonly known as:  VITAMIN D3 Take 1 Tab by mouth daily. FLUoxetine 20 mg tablet Commonly known as:  PROzac Take 1 Tab by mouth daily. miSOPROStol 200 mcg tablet Commonly known as:  CYTOTEC PLACE ONE TAB VAGINALLY THE NIGHT PRIOR TO SURGERY We Performed the Following REFERRAL TO PHYSICAL THERAPY [SFQ07 Custom] Follow-up Instructions Return if symptoms worsen or fail to improve. To-Do List   
 03/12/2018 Imaging:  XR SPINE CERV 4 OR 5 V   
  
 03/12/2018 Imaging:  XR SPINE LUMB 2 OR 3 V Referral Information Referral ID Referred By Referred To  
  
 [id number] Nafisa Go [facility] PT [facility]   
   [address] [facility]   
   [place], [address] Phone: [phone] Fax: [phone] Visits Status Start Date End Date  
 20 New Request 3/12/18 3/12/19 If your referral has a status of pending review or denied, additional information will be sent to support the outcome of this decision. Introducing [facility] [place] [facility]! Dear Adrienne Multani: Thank you for requesting a [employer] account. Our records indicate that you already have an active [employer] account. You can access your account anytime at [email or web address]. [employer] YES[employer] [email or web address] Did you know that you can access your hospital and ER discharge instructions at any time in [employer]? You can also review all of your test results from your hospital stay or ER visit. Additional Information If you have questions, please visit the Frequently Asked Questions section of the [employer] website at [email or web address]. [email or web address]/. Remember, MyChart is NOT to be used for urgent needs. For medical emergencies, dial 911. Now available from your iPhone and Android! Please provide this summary of care documentation to your next provider. Your primary care clinician is listed as Shantel De La Cruz. If you have any questions after today's visit, please call [phone].

## 2018-04-05 ENCOUNTER — HOSPITAL ENCOUNTER (OUTPATIENT)
Dept: PHYSICAL THERAPY | Age: 28
Discharge: HOME | End: 2018-04-05
Payer: COMMERCIAL

## 2018-04-05 PROCEDURE — 97110 THERAPEUTIC EXERCISES: CPT | Performed by: PHYSICAL THERAPIST

## 2018-04-05 PROCEDURE — 97161 PT EVAL LOW COMPLEX 20 MIN: CPT | Performed by: PHYSICAL THERAPIST

## 2018-04-05 NOTE — PROGRESS NOTES
[employer] [facility]  [address], [address] [place] [address] [address]kw[address]  Phone: [phone]  Fax: [phone]    Plan of Care/Statement of Necessity for Physical Therapy Services  2-15    Patient name: Guillermo Oliver  : 1990  Provider#: [id number]  Referral source: Ellie Cardenas MD      Medical/Treatment Diagnosis: Neck pain [M54.2]  Low back pain [M54.5]     Prior Hospitalization: see medical history     Comorbidities: chronic back pain  Prior Level of Function: complete 20 minutes of exercise at least 3 times a week  Medications: Verified on Patient Summary List    Start of Care: 2018      Onset Date:        The Plan of Care and following information is based on the information from the initial evaluation. Assessment/ key information:  33 y.o, female with lumbosacral dysfunction and sacral right rotation and SI dysfunction. Lumbar and cervical spine pain secondary to postural strain.     Evaluation Complexity History LOW Complexity : Zero comorbidities / personal factors that will impact the outcome / POC; Examination HIGH Complexity : 4+ Standardized tests and measures addressing body structure, function, activity limitation and / or participation in recreation  ;Presentation LOW Complexity : Stable, uncomplicated  ;Clinical Decision Making--   Overall Complexity Rating: LOW     Problem List: pain affecting function, decrease ROM, decrease strength, decrease ADL/ functional abilitiies, decrease activity tolerance and decrease flexibility/ joint mobility   Treatment Plan may include any combination of the following: Therapeutic exercise, Therapeutic activities, Neuromuscular re-education, Physical agent/modality, Gait/balance training, Manual therapy, Patient education and Self Care training  Patient / Family readiness to learn indicated by: asking questions and trying to perform skills  Persons(s) to be included in education: patient (P)  Barriers to Learning/Limitations: None  Patient Goal (s): resume gym routine without pain  Patient Self Reported Health Status: good  Rehabilitation Potential: good    Long Term Goals: To be accomplished in 3-4 weeks:  1)  Pt will be able to Sit greater than 60 minutes without pain  2) Pt will be able to Stand greater than 60 minutes without increase of pain  3) Pt will be able to Ambulate greater than 1 mile without increase of pain  4) Pt will be able to retrieve item form ground without pain  5) Pt will be able to resume gym routine without pain  6) Pt will be independent with HEP      Frequency / Duration: Patient to be seen 1-2 times per week for 3-4 weeks. Patient/ Caregiver education and instruction: activity modification and exercises    [x]  Plan of care has been reviewed with NILTON Maldonado Ala PT, DPT 4/5/2018 7:00 PM    ________________________________________________________________________    I certify that the above Therapy Services are being furnished while the patient is under my care. I agree with the treatment plan and certify that this therapy is necessary.     [de-identified] Signature:____________________  Date:____________Time: _________

## 2018-04-05 NOTE — PROGRESS NOTES
PT INITIAL EVALUATION NOTE 2-15    Patient Name: Delfino Jordan  Date:2018  : 1990  [x]  Patient  Verified  Payor: Carmen Severance / Plan: [email or web address] / Product Type: HMO /    In time:410a  Out time:510a  Total Treatment Time (min): 60  Visit #: 1     Treatment Area: Neck pain [M54.2]  Low back pain [M54.5]    SUBJECTIVE  Pain Level (0-10 scale): 1/10  Any medication changes, allergies to medications, adverse drug reactions, diagnosis change, or new procedure performed?: [] No    [x] Yes (see summary sheet for update)  Subjective:     Onset of low back pain after doing a deadlift exercise 2 yrs ago. Neck pain intermittent over the past 5 years. Back is worse with dead lift and sitting. No numbness into LE. Neck is worse with increased stress loads at work. OBJECTIVE    Posture:  Slouched sitting posture with forward head and protracted scapula and sacral sitting   Other Observations:  --  Gait and Functional Mobility:  No pain with bed mobility  Palpation: tenderness bilateral PSIS and lumbar paraspinal muscles L5/S1, right upper trap and levator scapulae; right sacral rotation static postioning, normal PSIS and ASIS levels in standing and non-weight bearing        Lumbar AROM:          R  L    Flexion    50 P!  --    Extension   10 P!  --    Side Bending   15 P! L  15 P!  R    Rotation   30  30    Normal cervical ROM        LOWER QUARTER   MUSCLE STRENGTH  KEY       R  L  0 - No Contraction  L1, L2 Psoas  5  5  1 - Trace   L3 Quads  5  5  2 - Poor   L4 Tib Ant  5  5  3 - Fair    L5 EHL  5  5  4 - Good   S1 Peroneals  5  5  5 - Normal   S2 Hams  5  5    Flexibility: pec major, upper trap and levator scapula stiffness  Mobility Assessment:  Right sacral P-A painful      MMT:               HIP Ext: 5              HIP Abd: 5  Neurological: Reflexes / Sensations: normal  Special Tests:    FABERS: Positive on R   Forward Bend: positive   Slump: negative   H.S. SLR: negative    SI Compression/Distraction: Compression reduced pain        10 min Therapeutic Exercise:  [x] See flow sheet : demonstration and preparation of HEP   Rationale: increase ROM, increase strength, improve coordination, improve balance and increase proprioception to improve the patients ability to maintain core stabilization with activity      With   [x] TE   [] TA   [] neuro   [] other: Patient Education: [x] Review HEP    [] Progressed/Changed HEP based on:   [] positioning   [] body mechanics   [] transfers   [] heat/ice application    [] other:        Other Objective/Functional Measures:--     Pain Level (0-10 scale) post treatment: 1/10      ASSESSMENT:      [x]  See Plan of Care      Summer Navas, PT, DPT 4/5/2018  4:32 PM

## 2018-04-06 ENCOUNTER — APPOINTMENT (OUTPATIENT)
Dept: PHYSICAL THERAPY | Age: 28
End: 2018-04-06

## 2018-04-19 ENCOUNTER — HOSPITAL ENCOUNTER (OUTPATIENT)
Dept: PHYSICAL THERAPY | Age: 28
Discharge: HOME | End: 2018-04-19
Payer: COMMERCIAL

## 2018-04-19 PROCEDURE — 97140 MANUAL THERAPY 1/> REGIONS: CPT

## 2018-04-19 PROCEDURE — 97110 THERAPEUTIC EXERCISES: CPT

## 2018-04-19 NOTE — PROGRESS NOTES
PT DAILY TREATMENT NOTE - [facility] 2-15    Patient Name: Lui Shen  Date:2018  : 1990  [x]  Patient  Verified  Payor: Haris Manley / Plan: Karen Tan / Product Type: HMO /    In time:5:30P  Out time:6:20P  Total Treatment Time (min): 50  Total Timed Codes (min): 50  1:1 Treatment Time ( only): --   Visit #: 2     Treatment Area: Neck pain [M54.2]  Low back pain [M54.5]    SUBJECTIVE  Pain Level (0-10 scale): 0/10  Any medication changes, allergies to medications, adverse drug reactions, diagnosis change, or new procedure performed?: [x] No    [] Yes (see summary sheet for update)  Subjective functional status/changes:   [] No changes reported  Pt reported going to the gym last week. She participates in HIIT workouts. Pt reported feeling some pain in sacrum with lateral plyometric movements.      OBJECTIVE    Modality rationale: decrease pain and increase tissue extensibility to improve the patients ability to decrease LBP   Min Type Additional Details   -- [] Estim: []Att   []Unatt        []TENS instruct                  []IFC  []Premod   []NMES                     []Other:  []w/US   []w/ice   []w/heat  Position:  Location:    []  Traction: [] Cervical       []Lumbar                       [] Prone          []Supine                       []Intermittent   []Continuous Lbs:  [] before manual  [] after manual  []w/heat    []  Ultrasound: []Continuous   [] Pulsed at:                           []1MHz   []3MHz Location:  W/cm2:    [] Paraffin         Location:   []w/heat    []  Ice     []  Heat  []  Ice massage Position:  Location:    []  Laser  []  Other: Position:  Location:      []  Vasopneumatic Device Pressure:       [] lo [] med [] hi   Temperature:      [x] Skin assessment post-treatment:  [x]intact []redness- no adverse reaction    []redness - adverse reaction:     35 min Therapeutic Exercise:  [x] See flow sheet :   Rationale: increase ROM, increase strength and improve coordination to improve the patients ability to increase core activation and stability    15 min Manual Therapy: Sacral extension stretch, R sacral rotation mob. Rationale: decrease pain, increase ROM, increase tissue extensibility and decrease trigger points to improve the patients ability to increase mobility          With   [] TE   [] TA   [] neuro   [] other: Patient Education: [x] Review HEP    [] Progressed/Changed HEP based on:   [] positioning   [] body mechanics   [] transfers   [] heat/ice application    [] other:      Other Objective/Functional Measures: assessed pelvic alignment. Good symmetry noted. Pain Level (0-10 scale) post treatment: 0/10    ASSESSMENT/Changes in Function:   Pt demonstrated increased fatigue with pelvic stability exercises today. Patient will continue to benefit from skilled PT services to modify and progress therapeutic interventions, address functional mobility deficits, address ROM deficits, address strength deficits, analyze and address soft tissue restrictions, analyze and cue movement patterns, analyze and modify body mechanics/ergonomics and assess and modify postural abnormalities to attain remaining goals. [x]  See Plan of Care  []  See progress note/recertification  []  See Discharge Summary         Progress towards goals / Updated goals:  Long Term Goals:  To be accomplished in 3-4 weeks:  1)  Pt will be able to Sit greater than 60 minutes without pain  2) Pt will be able to Stand greater than 60 minutes without increase of pain  3) Pt will be able to Ambulate greater than 1 mile without increase of pain  4) Pt will be able to retrieve item form ground without pain  5) Pt will be able to resume gym routine without pain  6) Pt will be independent with HEP                    PLAN  [x]  Upgrade activities as tolerated     [x]  Continue plan of care  []  Update interventions per flow sheet       []  Discharge due to:_  []  Other:_      Pablo Jones, NILTON 4/19/2018  5:34 PM

## 2018-04-26 ENCOUNTER — HOSPITAL ENCOUNTER (OUTPATIENT)
Dept: PHYSICAL THERAPY | Age: 28
Discharge: HOME | End: 2018-04-26
Payer: COMMERCIAL

## 2018-04-26 PROCEDURE — 97110 THERAPEUTIC EXERCISES: CPT

## 2018-04-26 PROCEDURE — 97140 MANUAL THERAPY 1/> REGIONS: CPT

## 2018-04-26 NOTE — PROGRESS NOTES
PT DAILY TREATMENT NOTE - [facility] 2-15    Patient Name: Tereza Alvarez  Date:2018  : 1990  [x]  Patient  Verified  Payor: [employer] Life / Plan: Tonnydaiana Aguirrelynette / Product Type: HMO /    In time:5:30P  Out time: 6:35P  Total Treatment Time (min): 65  Total Timed Codes (min): 65  1:1 Treatment Time (MC only): --   Visit #: 3     Treatment Area: Neck pain [M54.2]  Low back pain [M54.5]    SUBJECTIVE  Pain Level (0-10 scale): 0/10  Any medication changes, allergies to medications, adverse drug reactions, diagnosis change, or new procedure performed?: [x] No    [] Yes (see summary sheet for update)  Subjective functional status/changes:   [] No changes reported  Pt reported not having sacrum pain since last session. Pt reported increase in L glut tightness while in class on Saturday. Pt reported rolling and stretching which helped.      OBJECTIVE    Modality rationale: decrease pain and increase tissue extensibility to improve the patients ability to decrease LBP   Min Type Additional Details   -- [] Estim: []Att   []Unatt        []TENS instruct                  []IFC  []Premod   []NMES                     []Other:  []w/US   []w/ice   []w/heat  Position:  Location:    []  Traction: [] Cervical       []Lumbar                       [] Prone          []Supine                       []Intermittent   []Continuous Lbs:  [] before manual  [] after manual  []w/heat    []  Ultrasound: []Continuous   [] Pulsed at:                           []1MHz   []3MHz Location:  W/cm2:    [] Paraffin         Location:   []w/heat   To go [x]  Ice     []  Heat  []  Ice massage Position:  Location:    []  Laser  []  Other: Position:  Location:      []  Vasopneumatic Device Pressure:       [] lo [] med [] hi   Temperature:      [x] Skin assessment post-treatment:  [x]intact []redness- no adverse reaction    []redness - adverse reaction:     50 min Therapeutic Exercise:  [x] See flow sheet :   Rationale: increase ROM, increase strength and improve coordination to improve the patients ability to increase core activation and stability    15 min Manual Therapy: Sacral extension stretch, R sacral rotation mob, MET to correct L post ilium rotation. Rationale: decrease pain, increase ROM, increase tissue extensibility and decrease trigger points to improve the patients ability to increase mobility          With   [] TE   [] TA   [] neuro   [] other: Patient Education: [x] Review HEP    [] Progressed/Changed HEP based on:   [] positioning   [] body mechanics   [] transfers   [] heat/ice application    [] other:      Other Objective/Functional Measures: --    Pain Level (0-10 scale) post treatment: 0/10    ASSESSMENT/Changes in Function:   Pt tolerated session well. Advanced core stabilization exercises today. Pt tolerated without increase in LBP. Pt encouraged to avoid rotational movement to maintain neutral alignment. Patient will continue to benefit from skilled PT services to modify and progress therapeutic interventions, address functional mobility deficits, address ROM deficits, address strength deficits, analyze and address soft tissue restrictions, analyze and cue movement patterns, analyze and modify body mechanics/ergonomics and assess and modify postural abnormalities to attain remaining goals. [x]  See Plan of Care  []  See progress note/recertification  []  See Discharge Summary         Progress towards goals / Updated goals:  Long Term Goals:  To be accomplished in 3-4 weeks:  1)  Pt will be able to Sit greater than 60 minutes without pain  2) Pt will be able to Stand greater than 60 minutes without increase of pain  3) Pt will be able to Ambulate greater than 1 mile without increase of pain  4) Pt will be able to retrieve item form ground without pain  5) Pt will be able to resume gym routine without pain  6) Pt will be independent with HEP                    PLAN  [x]  Upgrade activities as tolerated     [x]  Continue plan of care  []  Update interventions per flow sheet       []  Discharge due to:_  []  Other:_      Amie Keller, NILTON 4/26/2018  5:34 PM

## 2018-05-07 ENCOUNTER — APPOINTMENT (OUTPATIENT)
Dept: PHYSICAL THERAPY | Age: 28
End: 2018-05-07
Payer: COMMERCIAL

## 2018-05-09 ENCOUNTER — HOSPITAL ENCOUNTER (OUTPATIENT)
Dept: PHYSICAL THERAPY | Age: 28
Discharge: HOME | End: 2018-05-09
Payer: COMMERCIAL

## 2018-05-09 PROCEDURE — 97140 MANUAL THERAPY 1/> REGIONS: CPT

## 2018-05-09 PROCEDURE — 97110 THERAPEUTIC EXERCISES: CPT

## 2018-05-09 NOTE — PROGRESS NOTES
PT DAILY TREATMENT NOTE - [facility] 2-15    Patient Name: Renetta Ramey  Date:2018  : 1990  [x]  Patient  Verified  Payor: Consuelo Mcdonald / Plan: Pham Winner / Product Type: HMO /    In time:4:30P  Out time: 5:25P  Total Treatment Time (min): 55  Total Timed Codes (min): 55  1:1 Treatment Time ( only): --   Visit #: 4     Treatment Area: Neck pain [M54.2]  Low back pain [M54.5]    SUBJECTIVE  Pain Level (0-10 scale): 0/10  Any medication changes, allergies to medications, adverse drug reactions, diagnosis change, or new procedure performed?: [x] No    [] Yes (see summary sheet for update)  Subjective functional status/changes:   [] No changes reported  Pt reported feeling good after last session. Pt reported working out yesterday where she did some lateral shuffling. She reported being aniceto to \"feel\" it on the R side of sacrum but it was less than it had been previously.      OBJECTIVE    Modality rationale: decrease pain and increase tissue extensibility to improve the patients ability to decrease LBP   Min Type Additional Details   -- [] Estim: []Att   []Unatt        []TENS instruct                  []IFC  []Premod   []NMES                     []Other:  []w/US   []w/ice   []w/heat  Position:  Location:    []  Traction: [] Cervical       []Lumbar                       [] Prone          []Supine                       []Intermittent   []Continuous Lbs:  [] before manual  [] after manual  []w/heat    []  Ultrasound: []Continuous   [] Pulsed at:                           []1MHz   []3MHz Location:  W/cm2:    [] Paraffin         Location:   []w/heat   To go [x]  Ice     []  Heat  []  Ice massage Position:  Location:    []  Laser  []  Other: Position:  Location:      []  Vasopneumatic Device Pressure:       [] lo [] med [] hi   Temperature:      [x] Skin assessment post-treatment:  [x]intact []redness- no adverse reaction    []redness - adverse reaction:     40 min Therapeutic Exercise:  [x] See flow sheet : Rationale: increase ROM, increase strength and improve coordination to improve the patients ability to increase core activation and stability    15 min Manual Therapy: Sacral extension stretch, R sacral rotation mob, MET to correct L post ilium rotation, B piriformis release. Rationale: decrease pain, increase ROM, increase tissue extensibility and decrease trigger points to improve the patients ability to increase mobility          With   [] TE   [] TA   [] neuro   [] other: Patient Education: [x] Review HEP    [] Progressed/Changed HEP based on:   [] positioning   [] body mechanics   [] transfers   [] heat/ice application    [] other:      Other Objective/Functional Measures: --    Pain Level (0-10 scale) post treatment: 0/10    ASSESSMENT/Changes in Function:   Pt reported less TTP along R sacrum following L piriformis release today. Pt reported less pain/discomfortwith lateral shuffling following manual. Assessed side shuffling. Pt advised instead of making a [place] with her side shuffling to break the movement down into a square while maintaining forward positioning. Assess next session L and R piriformis in functional strengthening positioning along with lumbar mobility. Patient will continue to benefit from skilled PT services to modify and progress therapeutic interventions, address functional mobility deficits, address ROM deficits, address strength deficits, analyze and address soft tissue restrictions, analyze and cue movement patterns, analyze and modify body mechanics/ergonomics and assess and modify postural abnormalities to attain remaining goals. [x]  See Plan of Care  []  See progress note/recertification  []  See Discharge Summary         Progress towards goals / Updated goals:  Long Term Goals:  To be accomplished in 3-4 weeks:  1)  Pt will be able to Sit greater than 60 minutes without pain  2) Pt will be able to Stand greater than 60 minutes without increase of pain  3) Pt will be able to Ambulate greater than 1 mile without increase of pain  4) Pt will be able to retrieve item form ground without pain  5) Pt will be able to resume gym routine without pain  6) Pt will be independent with HEP                    PLAN  [x]  Upgrade activities as tolerated     [x]  Continue plan of care  []  Update interventions per flow sheet       []  Discharge due to:_  []  Other:_      Amalia Castro, NILTON 5/9/2018  5:34 PM

## 2018-05-14 ENCOUNTER — HOSPITAL ENCOUNTER (OUTPATIENT)
Dept: PHYSICAL THERAPY | Age: 28
Discharge: HOME | End: 2018-05-14
Payer: COMMERCIAL

## 2018-05-14 PROCEDURE — 97110 THERAPEUTIC EXERCISES: CPT | Performed by: PHYSICAL THERAPIST

## 2018-05-14 PROCEDURE — 97140 MANUAL THERAPY 1/> REGIONS: CPT | Performed by: PHYSICAL THERAPIST

## 2018-05-14 NOTE — PROGRESS NOTES
PT DAILY TREATMENT NOTE - [facility] 2-15    Patient Name: Benedict Sheffield  Date:2018  : 1990  [x]  Patient  Verified  Payor: Zoe Maurice / Plan: Vero Dale / Product Type: HMO /    In time:335p  Out time:440p  Total Treatment Time (min): 65  Total Timed Codes (min): 65  1:1 Treatment Time ( only): --   Visit #: 5     Treatment Area: Neck pain [M54.2]  Low back pain [M54.5]    SUBJECTIVE  Pain Level (0-10 scale): 0/10  Any medication changes, allergies to medications, adverse drug reactions, diagnosis change, or new procedure performed?: [x] No    [] Yes (see summary sheet for update)  Subjective functional status/changes:   [] No changes reported  Pt states that she is still having the most pain with doing a side shuffling drill moving to her left and then turning the corner left.     OBJECTIVE    50 min Therapeutic Exercise:  [x] See flow sheet :   Rationale: increase ROM, increase strength and improve coordination to improve the patients ability to increase core activation and stability     15 min Manual Therapy: Sacral extension stretch, R sacral rotation mob, Right piriformis release   Rationale: decrease pain, increase ROM, increase tissue extensibility and decrease trigger points to improve the patients ability to increase mobility      With   [] TE   [] TA   [] neuro   [] other: Patient Education: [x] Review HEP    [] Progressed/Changed HEP based on:   [] positioning   [] body mechanics   [] transfers   [] heat/ice application    [] other:       Other Objective/Functional Measures: noted good pelvic symmetry today.     Pain Level (0-10 scale) post treatment: 0/10     ASSESSMENT/Changes in Function:   Able to resolve right hip pain during left side shuffle movement turning corner to left (requiring Right hip ER and Left Hip ER just prior to transition to Right hip IR to push forward) with cueing/neuromuscular reeducation for Right Hip ER group initiating movement to the left vs upper torso initiating left rotation movement. Added hip abd in standing as well.   Patient will continue to benefit from skilled PT services to modify and progress therapeutic interventions, address functional mobility deficits, address ROM deficits, address strength deficits, analyze and address soft tissue restrictions, analyze and cue movement patterns, analyze and modify body mechanics/ergonomics and assess and modify postural abnormalities to attain remaining goals.      [x]  See Plan of Care  []  See progress note/recertification  []  See Discharge Summary      Progress towards goals / Updated goals:  Long Term Goals: To be accomplished in 3-4 weeks:  1)  Pt will be able to Sit greater than 60 minutes without pain  2) Pt will be able to Stand greater than 60 minutes without increase of pain  3) Pt will be able to Ambulate greater than 1 mile without increase of pain  4) Pt will be able to retrieve item form ground without pain  5) Pt will be able to resume gym routine without pain  6) Pt will be independent with HEP   Progressing                  PLAN  [x]  Upgrade activities as tolerated     [x]  Continue plan of care  []  Update interventions per flow sheet       []  Discharge due to:_  []  Other:_      Linda Powers, PT, DPT 5/14/2018  4:01 PM

## 2018-05-24 ENCOUNTER — HOSPITAL ENCOUNTER (OUTPATIENT)
Dept: PHYSICAL THERAPY | Age: 28
Discharge: HOME | End: 2018-05-24
Payer: COMMERCIAL

## 2018-05-24 ENCOUNTER — OFFICE VISIT (OUTPATIENT)
Dept: NEUROLOGY | Age: 28
End: 2018-05-24

## 2018-05-24 DIAGNOSIS — F43.22 ADJUSTMENT DISORDER WITH ANXIETY: Primary | ICD-10-CM

## 2018-05-24 DIAGNOSIS — R79.89 ELEVATED PLATELET COUNT: ICD-10-CM

## 2018-05-24 DIAGNOSIS — Z97.5 IMPLANON IN PLACE: ICD-10-CM

## 2018-05-24 DIAGNOSIS — E55.9 VITAMIN D DEFICIENCY: ICD-10-CM

## 2018-05-24 DIAGNOSIS — R41.840 INATTENTION: ICD-10-CM

## 2018-05-24 PROCEDURE — 97140 MANUAL THERAPY 1/> REGIONS: CPT

## 2018-05-24 PROCEDURE — 97110 THERAPEUTIC EXERCISES: CPT

## 2018-05-24 NOTE — PROGRESS NOTES
PT DAILY TREATMENT NOTE - [facility] 2-15    Patient Name: Hanna Owens  Date:2018  : 1990  [x]  Patient  Verified  Payor: Gillian Asif / Plan: Rudy Hernandez / Product Type: HMO /    In time: 7:00A Out time: 8:20A  Total Treatment Time (min): 80  Total Timed Codes (min): 80  1:1 Treatment Time ( only): --   Visit #: 7     Treatment Area: Neck pain [M54.2]  Low back pain [M54.5]    SUBJECTIVE  Pain Level (0-10 scale): 0/10  Any medication changes, allergies to medications, adverse drug reactions, diagnosis change, or new procedure performed?: [x] No    [] Yes (see summary sheet for update)  Subjective functional status/changes:   [] No changes reported  Pt reported doing well. Had a class last night they did dead lifting she stated that she felt it some in her gluts.      OBJECTIVE    Modality rationale: decrease pain and increase tissue extensibility to improve the patients ability to decrease LBP   Min Type Additional Details   -- [] Estim: []Att   []Unatt        []TENS instruct                  []IFC  []Premod   []NMES                     []Other:  []w/US   []w/ice   []w/heat  Position:  Location:    []  Traction: [] Cervical       []Lumbar                       [] Prone          []Supine                       []Intermittent   []Continuous Lbs:  [] before manual  [] after manual  []w/heat    []  Ultrasound: []Continuous   [] Pulsed at:                           []1MHz   []3MHz Location:  W/cm2:    [] Paraffin         Location:   []w/heat   -- [x]  Ice     []  Heat  []  Ice massage Position:  Location:    []  Laser  []  Other: Position:  Location:      []  Vasopneumatic Device Pressure:       [] lo [] med [] hi   Temperature:      [x] Skin assessment post-treatment:  [x]intact []redness- no adverse reaction    []redness - adverse reaction:     65 min Therapeutic Exercise:  [x] See flow sheet :   Rationale: increase ROM, increase strength and improve coordination to improve the patients ability to increase core activation and stability    15 min Manual Therapy: Sacral extension stretch, R sacral rotation mob, B piriformis release, MET for R anterior pelvic rotation. Rationale: decrease pain, increase ROM, increase tissue extensibility and decrease trigger points to improve the patients ability to increase mobility          With   [] TE   [] TA   [] neuro   [] other: Patient Education: [x] Review HEP    [] Progressed/Changed HEP based on:   [] positioning   [] body mechanics   [] transfers   [] heat/ice application    [] other:      Other Objective/Functional Measures: --    Pain Level (0-10 scale) post treatment: 0/10    ASSESSMENT/Changes in Function:   Pt tolerated session well. Added Fitter ER and slide board. Pt reported fatigue with keeping ER engagement but able to tolerate pain free . Patient will continue to benefit from skilled PT services to modify and progress therapeutic interventions, address functional mobility deficits, address ROM deficits, address strength deficits, analyze and address soft tissue restrictions, analyze and cue movement patterns, analyze and modify body mechanics/ergonomics and assess and modify postural abnormalities to attain remaining goals. [x]  See Plan of Care  []  See progress note/recertification  []  See Discharge Summary         Progress towards goals / Updated goals:  Long Term Goals:  To be accomplished in 3-4 weeks:  1)  Pt will be able to Sit greater than 60 minutes without pain  2) Pt will be able to Stand greater than 60 minutes without increase of pain  3) Pt will be able to Ambulate greater than 1 mile without increase of pain  4) Pt will be able to retrieve item form ground without pain  5) Pt will be able to resume gym routine without pain  6) Pt will be independent with HEP                    PLAN  [x]  Upgrade activities as tolerated     [x]  Continue plan of care  []  Update interventions per flow sheet       []  Discharge due to:_  []  Other:_ Madhav Zamudio, PTA 5/24/2018  5:34 PM

## 2018-05-24 NOTE — PROGRESS NOTES
[facility]  [address]   [place] [address] [facility]   Darci Estrella    [phone] Office   [phone] Fax      Neuropsychology    Initial Diagnostic Interview Note      Referral:  Junior Tong NP    Adam Jeronimo is a 33 y.o. right handed single  American female who was unaccompanied to the initial clinical interview on 5/24/18 . Please refer to her medical records for details pertaining to her history. Briefly, the patient reported that she completed a Master's Degree at [employer] without history of previously diagnosed LD and/or receipt of special education services. She works as an . She has noticed that she has been struggling with attention and concentration for her whole but cultural factors may have been a factor in terms of not seeking out assessment for same. She struggles with completing tasks. 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 The cognitive issues were impacting her functioning in high school and college. She doesn't complete tasks. Feels like she's not producing the work that she knows she can. Feels all over the place. Struggles with getting herself organized. This also impacts home life. Tries to dedicate Sundays to getting herself together, and just can't get herself started or find the motivation, and will just lay in bed and stay there, even though she knows she needs to get stuff done. She has feelings of losing control. She has no major medical problems. No history of neurologic problems. No known family history of ADHD type concerns, but not something that was discussed or thought about in her family. PCP tried her on Wellbutrin, but didn't like it, felt like it wasn't helpful and felt brain fog. Sleep is fine. Appetite is okay. Relationship going alright. Coworkers will joke around and call her out on it.   No current legal troubles. Gets overwhelmed and anxious with all of this. No counseling or psychiatrist currently. Wouldn't not say she is depressed, but definitely anxious. She did see a therapist for a couple of visits in February because of family troubles and not current in treatment. She was prescribed Prozac and has not taken it pending this evaluation. She has an IUD Kusum Kapoor). Also on Vitamin D. Misplaces things. Independent for her ADLs. No previous neuropsych. Neuropsychological Mental Status Exam (NMSE):  Historian: Good  Praxis: No UE apraxia  R/L Orientation: Intact to self and to other  Dress: within normal limits   Weight: within normal limits   Appearance/Hygiene: within normal limits   Gait: within normal limits   Assistive Devices: None  Mood: within normal limits   Affect: within normal limits   Comprehension: within normal limits   Thought Process: within normal limits   Expressive Language: within normal limits   Receptive Language: within normal limits   Motor:  No cognitive or motor perseveration  ETOH: 3-4 drinks during the week, including weekend  Tobacco: Denied  Illicit: She has smoked marijuana a few times, and has not noticed an impact on anxiety. Denied other  SI/HI: Denied  Psychosis: Denied  Insight: Within normal limits  Judgment: Within normal limits  Other Psych:      History reviewed. No pertinent past medical history. Past Surgical History:   Procedure Laterality Date    HX WISDOM TEETH EXTRACTION  4/2015       No Known Allergies    Family History   Problem Relation Age of Onset    Hypertension Mother        Social History   Substance Use Topics    Smoking status: Never Smoker    Smokeless tobacco: Never Used    Alcohol use 2.4 oz/week     4 Cans of beer per week      Comment: weekly       Current Outpatient Prescriptions   Medication Sig Dispense Refill    FLUoxetine (PROZAC) 20 mg tablet Take 1 Tab by mouth daily.  30 Tab 2    miSOPROStol (CYTOTEC) 200 mcg tablet PLACE ONE TAB VAGINALLY THE NIGHT PRIOR TO SURGERY  0    cholecalciferol, VITAMIN D3, (VITAMIN D3) 5,000 unit tab tablet Take 1 Tab by mouth daily. 30 Tab 11         Plan:  Obtain authorization for testing from [employer]. Report to follow once testing, scoring, and interpretation completed. ? Organic based neurocognitive issues versus mood disorder or combination of same. ? Problems organic, functional, or both? This note will not be viewable in [address] 33year old presents with anxiety and concentration problems with overlap of symptoms here. PCP would like eval or organic based cognitive concerns versus mood (functional, organic, cultural) or combination of same?

## 2018-05-29 ENCOUNTER — HOSPITAL ENCOUNTER (OUTPATIENT)
Dept: PHYSICAL THERAPY | Age: 28
Discharge: HOME | End: 2018-05-29
Payer: COMMERCIAL

## 2018-05-29 PROCEDURE — 97110 THERAPEUTIC EXERCISES: CPT | Performed by: PHYSICAL THERAPIST

## 2018-05-29 PROCEDURE — 97140 MANUAL THERAPY 1/> REGIONS: CPT | Performed by: PHYSICAL THERAPIST

## 2018-05-29 PROCEDURE — 97530 THERAPEUTIC ACTIVITIES: CPT | Performed by: PHYSICAL THERAPIST

## 2018-06-04 ENCOUNTER — HOSPITAL ENCOUNTER (OUTPATIENT)
Dept: PHYSICAL THERAPY | Age: 28
Discharge: HOME | End: 2018-06-04
Payer: COMMERCIAL

## 2018-06-04 PROCEDURE — 97110 THERAPEUTIC EXERCISES: CPT | Performed by: PHYSICAL THERAPIST

## 2018-06-04 PROCEDURE — 97530 THERAPEUTIC ACTIVITIES: CPT | Performed by: PHYSICAL THERAPIST

## 2018-06-04 NOTE — PROGRESS NOTES
PT DAILY TREATMENT NOTE - [facility] 2-15    Patient Name: Hanna Owens  Date: 2018  : 1990  [x]  Patient  Verified  Payor: Gillian Asif / Plan: Rudy Hernandez / Product Type: HMO /    In time:340p  Out time:440p  Total Treatment Time (min): 60  Total Timed Codes (min): 60  1:1 Treatment Time ( only): --   Visit #: 8     Treatment Area: Neck pain [M54.2]  Low back pain [M54.5]    SUBJECTIVE  Pain Level (0-10 scale): 0/10  Any medication changes, allergies to medications, adverse drug reactions, diagnosis change, or new procedure performed?: [x] No    [] Yes (see summary sheet for update)  Subjective functional status/changes:   [] No changes reported  Pt states that she noticed SI pain with squat jumping activity last night at the gym. Today not painful. OBJECTIVE  45 min Therapeutic Exercise:  [x] See flow sheet :   Rationale: increase ROM, increase strength and improve coordination to improve the patients ability to increase core activation and stability     15 min Therapeutic Activity:  [x] See flow sheet : review of squatting technique and with progression of squat jumping speed slow to moderate   Rationale: increase ROM, increase strength and improve coordination to improve the patients ability to increase core activation and stability          With   [] TE   [] TA   [] neuro   [] other: Patient Education: [x] Review HEP    [] Progressed/Changed HEP based on:   [] positioning   [] body mechanics   [] transfers   [] heat/ice application    [] other:        Other Objective/Functional Measures: --      Pain Level (0-10 scale) post treatment: 0/10      ASSESSMENT/Changes in Function:   Initially demonstrated increased lumbar flexion and sacral extension before her hips were near the level of her knees during eccentric phase of squatting movement. Improved with cueing and corrective exercises.  Then noted that she would lose lumbar extension/sacral flexion position with hips at or below knees on concentric phase of squatting movement. Again improved to normal performance on concentric and eccentric phases of squat movement with slow movement speed that progressed to moderate speed with correct performance. No pain noted with correct movement patterns of squatting movement.   Patient will continue to benefit from skilled PT services to modify and progress therapeutic interventions, address functional mobility deficits, address ROM deficits, address strength deficits, analyze and address soft tissue restrictions, analyze and cue movement patterns, analyze and modify body mechanics/ergonomics and assess and modify postural abnormalities to attain remaining goals.      []  See Plan of Care  []  See progress note/recertification  []  See Discharge Summary      Progress towards goals / Updated goals:  Long Term Goals: To be accomplished in 3-4 weeks:  1)  Pt will be able to Sit greater than 60 minutes without pain MET  2) Pt will be able to Stand greater than 60 minutes without increase of pain MET  3) Pt will be able to Ambulate greater than 1 mile without increase of pain MET  4) Pt will be able to retrieve item form ground without pain  5) Pt will be able to resume gym routine without pain  6) Pt will be independent with HEP                      PLAN  [x]  Upgrade activities as tolerated     [x]  Continue plan of care  []  Update interventions per flow sheet       []  Discharge due to:_  []  Other:_      Erica Calvert, PT, DPT 6/14/2018  9:18 AM

## 2018-06-07 ENCOUNTER — HOSPITAL ENCOUNTER (OUTPATIENT)
Dept: PHYSICAL THERAPY | Age: 28
Discharge: HOME | End: 2018-06-07
Payer: COMMERCIAL

## 2018-06-07 PROCEDURE — 97140 MANUAL THERAPY 1/> REGIONS: CPT

## 2018-06-07 PROCEDURE — 97110 THERAPEUTIC EXERCISES: CPT

## 2018-06-07 NOTE — PROGRESS NOTES
PT DAILY TREATMENT NOTE - [facility] 2-15    Patient Name: Delfino Jordan  Date:2018  : 1990  [x]  Patient  Verified  Payor: Carmen Severance / Plan: Jessica Stewarth / Product Type: HMO /    In time: 3:30P Out time: 4:30P  Total Treatment Time (min): 60  Total Timed Codes (min): 60  1:1 Treatment Time ( only): --   Visit #: 9    Treatment Area: Neck pain [M54.2]  Low back pain [M54.5]    SUBJECTIVE  Pain Level (0-10 scale): 0/10  Any medication changes, allergies to medications, adverse drug reactions, diagnosis change, or new procedure performed?: [x] No    [] Yes (see summary sheet for update)  Subjective functional status/changes:   [] No changes reported  Pt reports waking up with some pain this morning did her HEP which seemed to calm things back down. Had some soreness after workout on Tuesday. No exercise in particular brought soreness on. Pt does state she is not the es with doing her HEP.      OBJECTIVE    Modality rationale: decrease pain and increase tissue extensibility to improve the patients ability to decrease LBP   Min Type Additional Details   -- [] Estim: []Att   []Unatt        []TENS instruct                  []IFC  []Premod   []NMES                     []Other:  []w/US   []w/ice   []w/heat  Position:  Location:    []  Traction: [] Cervical       []Lumbar                       [] Prone          []Supine                       []Intermittent   []Continuous Lbs:  [] before manual  [] after manual  []w/heat    []  Ultrasound: []Continuous   [] Pulsed at:                           []1MHz   []3MHz Location:  W/cm2:    [] Paraffin         Location:   []w/heat   -- [x]  Ice     []  Heat  []  Ice massage Position:  Location:    []  Laser  []  Other: Position:  Location:      []  Vasopneumatic Device Pressure:       [] lo [] med [] hi   Temperature:      [x] Skin assessment post-treatment:  [x]intact []redness- no adverse reaction    []redness - adverse reaction:     45 min Therapeutic Exercise:  [x] See flow sheet :   Rationale: increase ROM, increase strength and improve coordination to improve the patients ability to increase core activation and stability    15 min Manual Therapy: Sacral extension stretch, R sacral rotation mob, B piriformis release, MET for R anterior pelvic rotation. Rationale: decrease pain, increase ROM, increase tissue extensibility and decrease trigger points to improve the patients ability to increase mobility          With   [] TE   [] TA   [] neuro   [] other: Patient Education: [x] Review HEP    [] Progressed/Changed HEP based on:   [] positioning   [] body mechanics   [] transfers   [] heat/ice application    [] other:      Other Objective/Functional Measures: --    Pain Level (0-10 scale) post treatment: 0/10    ASSESSMENT/Changes in Function:   Pt reported she gets pain with weighted squats, dead lifts, lunges and jumping lunges. Pt demonstrated increase trunk flexion with disengagement of core activation and stabilization. Pt responded favorably to verbal and tactile cues. Pt advised to work on exercises with corrections using a mirror in the gym Pt will FU with PT next session for assessment and plan moving forward. Patient will continue to benefit from skilled PT services to modify and progress therapeutic interventions, address functional mobility deficits, address ROM deficits, address strength deficits, analyze and address soft tissue restrictions, analyze and cue movement patterns, analyze and modify body mechanics/ergonomics and assess and modify postural abnormalities to attain remaining goals. [x]  See Plan of Care  []  See progress note/recertification  []  See Discharge Summary         Progress towards goals / Updated goals:  Long Term Goals:  To be accomplished in 3-4 weeks:  1)  Pt will be able to Sit greater than 60 minutes without pain  2) Pt will be able to Stand greater than 60 minutes without increase of pain  3) Pt will be able to Ambulate greater than 1 mile without increase of pain  4) Pt will be able to retrieve item form ground without pain  5) Pt will be able to resume gym routine without pain  6) Pt will be independent with HEP                    PLAN  [x]  Upgrade activities as tolerated     [x]  Continue plan of care  []  Update interventions per flow sheet       []  Discharge due to:_  []  Other:_      Heidy Corrales, NILTON 6/7/2018  5:34 PM

## 2018-06-18 ENCOUNTER — OFFICE VISIT (OUTPATIENT)
Dept: NEUROLOGY | Age: 28
End: 2018-06-18

## 2018-06-18 DIAGNOSIS — F90.0 ATTENTION DEFICIT HYPERACTIVITY DISORDER (ADHD), INATTENTIVE TYPE, MODERATE: ICD-10-CM

## 2018-06-18 DIAGNOSIS — F43.21 ADJUSTMENT DISORDER WITH DEPRESSED MOOD: ICD-10-CM

## 2018-06-18 DIAGNOSIS — F41.9 MODERATE ANXIETY: Primary | ICD-10-CM

## 2018-06-19 ENCOUNTER — HOSPITAL ENCOUNTER (OUTPATIENT)
Dept: PHYSICAL THERAPY | Age: 28
Discharge: HOME | End: 2018-06-19
Payer: COMMERCIAL

## 2018-06-19 PROCEDURE — 97140 MANUAL THERAPY 1/> REGIONS: CPT

## 2018-06-19 PROCEDURE — 97110 THERAPEUTIC EXERCISES: CPT

## 2018-06-19 NOTE — PROGRESS NOTES
[facility]   [address] [address], [address] [place] [address]  Phone: [phone] Fax: [phone]    Progress Note    Name: Fabián Phillips   : 1990   MD: Michelle Hunter MD       Treatment Diagnosis: Neck pain [M54.2]  Low back pain [M54.5]  Start of Care: 2018    Visits from Start of Care: 10  Missed Visits: 0    Summary of Care: Rito Ballesteros reports that she was able to get ride of her SI pain with her HEP. She has not had a chance to do her normal workout routines as she has been moving her apartment.     Assessment / Recommendations: Plan to transition to HEP over next 1-2 sessions. Progress towards goals / Updated goals:  Long Term Goals: To be accomplished in 3-4 weeks:  1)  Pt will be able to Sit greater than 60 minutes without pain MET  2) Pt will be able to Stand greater than 60 minutes without increase of pain MET  3) Pt will be able to Ambulate greater than 1 mile without increase of pain MET  4) Pt will be able to retrieve item form ground without pain MET  5) Pt will be able to resume gym routine without pain  6) Pt will be independent with HEP       Bi Jameson, PT, DPT 2018 1:33 PM    ________________________________________________________________________  NOTE TO PHYSICIAN:  Please complete the following and fax to: [facility]: Fax: [phone] . Perry Alcazar Retain this original for your records. If you are unable to process this request in 24 hours, please contact our office.        ____ I have read the above report and request that my patient continue therapy with the following changes/special instructions:  ____ I have read the above report and request that my patient be discharged from therapy    Physician's Signature:_________________ Date:___________Time:__________

## 2018-06-19 NOTE — PROGRESS NOTES
PT DAILY TREATMENT NOTE - [facility] 2-15    Patient Name: Rose Templeton  Date:2018  : 1990  [x]  Patient  Verified  Payor: Davina Watkins / Plan: Ej Sidhu / Product Type: HMO /    In time:1230p  Out time:120p  Total Treatment Time (min): 50  Total Timed Codes (min): 50  1:1 Treatment Time (MC only): --   Visit #: 10     Treatment Area: Neck pain [M54.2]  Low back pain [M54.5]    SUBJECTIVE  Pain Level (0-10 scale): 0/10  Any medication changes, allergies to medications, adverse drug reactions, diagnosis change, or new procedure performed?: [x] No    [] Yes (see summary sheet for update)  Subjective functional status/changes:   [] No changes reported  Pt reports that she was able to get ride of her SI pain with her HEP. She has not had a chance to do her normal workout routines as she has been moving her apartment.     OBJECTIVE  Modality rationale: decrease pain and increase tissue extensibility to improve the patients ability to decrease LBP   Min Type Additional Details   -- [] Estim: []Att   []Unatt        []TENS instruct                  []IFC  []Premod   []NMES                     []Other:  []w/US   []w/ice   []w/heat  Position:  Location:     []  Traction: [] Cervical       []Lumbar                       [] Prone          []Supine                       []Intermittent   []Continuous Lbs:  [] before manual  [] after manual  []w/heat     []  Ultrasound: []Continuous   [] Pulsed at:                           []1MHz   []3MHz Location:  W/cm2:     [] Paraffin      Location:   []w/heat   -- [x]  Ice     []  Heat  []  Ice massage Position:  Location:     []  Laser  []  Other: Position:  Location:     []  Vasopneumatic Device Pressure:       [] lo [] med [] hi   Temperature:       [x] Skin assessment post-treatment:  [x]intact []redness- no adverse reaction    []redness - adverse reaction:      40 min Therapeutic Exercise:  [x] See flow sheet :   Rationale: increase ROM, increase strength and improve coordination to improve the patients ability to increase core activation and stability     10 min Manual Therapy: assessment of current status, MET for R anterior pelvic rotation correction. Rationale: decrease pain, increase ROM, increase tissue extensibility and decrease trigger points to improve the patients ability to increase mobility      With   [] TE   [] TA   [] neuro   [] other: Patient Education: [x] Review HEP    [] Progressed/Changed HEP based on:   [] positioning   [] body mechanics   [] transfers   [] heat/ice application    [] other:       Other Objective/Functional Measures: --     Pain Level (0-10 scale) post treatment: 0/10     ASSESSMENT/Changes in Function:   Demonstrated correct hip/lumbar performance during squats, squat jump and BOSU squats. Advanced to quadriped alt UE/LE ext. Will follow up 1-2 more sessions before transition to HEP.     Patient will continue to benefit from skilled PT services to modify and progress therapeutic interventions, address functional mobility deficits, address ROM deficits, address strength deficits, analyze and address soft tissue restrictions, analyze and cue movement patterns, analyze and modify body mechanics/ergonomics and assess and modify postural abnormalities to attain remaining goals.      [x]  See Plan of Care  []  See progress note/recertification  []  See Discharge Summary      Progress towards goals / Updated goals:  Long Term Goals: To be accomplished in 3-4 weeks:  1)  Pt will be able to Sit greater than 60 minutes without pain MET  2) Pt will be able to Stand greater than 60 minutes without increase of pain MET  3) Pt will be able to Ambulate greater than 1 mile without increase of pain MET  4) Pt will be able to retrieve item form ground without pain MET  5) Pt will be able to resume gym routine without pain  6) Pt will be independent with HEP                     PLAN  [x]  Upgrade activities as tolerated     [x]  Continue plan of care  []  Update interventions per flow sheet       []  Discharge due to:_  []  Other:_      Shelley Arora, PT, DPT 6/19/2018  12:44 PM

## 2018-06-25 ENCOUNTER — OFFICE VISIT (OUTPATIENT)
Dept: INTERNAL MEDICINE CLINIC | Facility: CLINIC | Age: 28
End: 2018-06-25

## 2018-06-25 VITALS
TEMPERATURE: 97.4 F | HEIGHT: 66 IN | DIASTOLIC BLOOD PRESSURE: 69 MMHG | HEART RATE: 70 BPM | SYSTOLIC BLOOD PRESSURE: 108 MMHG | BODY MASS INDEX: 24.43 KG/M2 | WEIGHT: 152 LBS | RESPIRATION RATE: 16 BRPM

## 2018-06-25 DIAGNOSIS — F90.0 ATTENTION DEFICIT HYPERACTIVITY DISORDER (ADHD), PREDOMINANTLY INATTENTIVE TYPE: Primary | ICD-10-CM

## 2018-06-25 PROBLEM — F90.9 ADHD: Status: ACTIVE | Noted: 2018-06-25

## 2018-06-25 RX ORDER — DEXTROAMPHETAMINE SACCHARATE, AMPHETAMINE ASPARTATE MONOHYDRATE, DEXTROAMPHETAMINE SULFATE AND AMPHETAMINE SULFATE 2.5; 2.5; 2.5; 2.5 MG/1; MG/1; MG/1; MG/1
10 CAPSULE, EXTENDED RELEASE ORAL
Qty: 30 CAP | Refills: 0 | Status: SHIPPED | OUTPATIENT
Start: 2018-06-25 | End: 2018-06-25

## 2018-06-25 RX ORDER — DEXTROAMPHETAMINE SACCHARATE, AMPHETAMINE ASPARTATE MONOHYDRATE, DEXTROAMPHETAMINE SULFATE AND AMPHETAMINE SULFATE 2.5; 2.5; 2.5; 2.5 MG/1; MG/1; MG/1; MG/1
10 CAPSULE, EXTENDED RELEASE ORAL
Qty: 30 CAP | Refills: 0 | Status: SHIPPED | OUTPATIENT
Start: 2018-06-25 | End: 2018-07-25 | Stop reason: ALTCHOICE

## 2018-06-25 RX ORDER — DEXTROAMPHETAMINE SACCHARATE, AMPHETAMINE ASPARTATE, DEXTROAMPHETAMINE SULFATE AND AMPHETAMINE SULFATE 2.5; 2.5; 2.5; 2.5 MG/1; MG/1; MG/1; MG/1
10 TABLET ORAL DAILY
Qty: 30 TAB | Refills: 0 | Status: SHIPPED | OUTPATIENT
Start: 2018-06-25 | End: 2018-07-25 | Stop reason: SDUPTHER

## 2018-06-25 NOTE — PROGRESS NOTES
Chief Complaint   Patient presents with    Follow-up     Pt would like to discuss visit with Dr. Jeramy Morgan on 6/18/18     1. Have you been to the ER, urgent care clinic since your last visit? Hospitalized since your last visit? No    2. Have you seen or consulted any other health care providers outside of the [address] [place] [address] since your last visit? Include any pap smears or colon screening. No   PRESCRIPTION REFILL POLICY  Effective 60/60/16    In an effort to ensure that the large volume of prescription refills are processed in the most efficient and expeditious manner, we are asking our patients to assist us by calling your pharmacy for all prescription refills. This will include Mail Order Pharmacies. The pharmacy will contact our office electronically to continue the refill process. Please do not wait until the last minute to call your pharmacy. We require 72 hours (3 days) to fill prescriptions. We also encourage you to call your pharmacy before picking up your prescription to make sure it is ready. With regard to controlled substance refill request (narcotics and many ADD/ADHD treatment prescriptions) and any other prescriptions that need to be picked up at our office, we ask your cooperation by providing us with at least 72 hours (3 days) notice prior to your need of the prescription. You will need to show a valid ID when picking up your prescription. Anyone delegated by you to  your prescriptions must be listed be listed on you HIPPA authorization form, and show a valid ID. Narcotics will not be refilled on a weekend or on a holiday in which the office is closed. We also encourage an alternative method of refill requests, which is through our medically secure web portal, [employer]. This is an efficient and effective way to communicate your requests directly with the office and provider. [employer] can be downloaded onto your smartphone as an [employer].  If you are ready to be connected, please review the attached instructions or speak to any of our staff to get you set up right away! Thank you so much for your cooperation. Should you have any questions, please contact our Practice Administrator, Jenna Mattson [phone].

## 2018-06-25 NOTE — PROGRESS NOTES
Subjective:      Shannon Freire is a 33 y.o. female who presents today for ADHD. Mental Health Review  Patient is seen for ADHD. She was recently diagnosed by neuro psych. She has not tried any medications for this as of yet aside from wellbutrin. She continues to have concentration issues and would like to start a new medication. Reviewed medications types and patient elects adderall XR. Will fill this and immediate release in case the XR is not covered. Discussed controlled substance policies with the patient and she filled out contract. Patient Active Problem List    Diagnosis Date Noted    ADHD 06/25/2018    Vitamin D deficiency 08/22/2016    Elevated platelet count 44/11/9817    Implanon in place 07/15/2016     Current Outpatient Prescriptions   Medication Sig Dispense Refill    FLUoxetine (PROZAC) 20 mg tablet Take 1 Tab by mouth daily. 30 Tab 2    miSOPROStol (CYTOTEC) 200 mcg tablet PLACE ONE TAB VAGINALLY THE NIGHT PRIOR TO SURGERY  0    cholecalciferol, VITAMIN D3, (VITAMIN D3) 5,000 unit tab tablet Take 1 Tab by mouth daily. 30 Tab 11     No Known Allergies  No past medical history on file. Review of Systems    A comprehensive review of systems was negative except for that written in the HPI. Objective:     Visit Vitals    /69    Pulse 70    Temp 97.4 °F (36.3 °C) (Oral)    Resp 16    Ht 5' 6\" (1.676 m)    Wt 152 lb (68.9 kg)    BMI 24.53 kg/m2     General appearance: alert, cooperative, no distress, appears stated age  Head: Normocephalic, without obvious abnormality, atraumatic  Eyes: negative  Lungs: clear to auscultation bilaterally  Heart: regular rate and rhythm, S1, S2 normal, no murmur, click, rub or gallop  Extremities: extremities normal, atraumatic, no cyanosis or edema  Neurologic: Grossly normal  Psych: appropriate mood, speech, affect  Nursing note and vitals reviewed  Assessment/Plan:       ICD-10-CM ICD-9-CM    1.  Attention deficit hyperactivity disorder (ADHD), predominantly inattentive type F90.0 314.00 amphetamine-dextroamphetamine XR (ADDERALL XR) 10 mg XR capsule      dextroamphetamine-amphetamine (ADDERALL) 10 mg tablet      DISCONTINUED: amphetamine-dextroamphetamine XR (ADDERALL XR) 10 mg XR capsule   XR to be filled first, if this needs a PA she will get immediate release  Follow-up Disposition:  Return in about 4 weeks (around 7/23/2018) for ADHD. Advised her to call back or return to office if symptoms worsen/change/persist.  Discussed expected course/resolution/complications of diagnosis in detail with patient. Medication risks/benefits/costs/interactions/alternatives discussed with patient. She was given an after visit summary which includes diagnoses, current medications, & vitals. She expressed understanding with the diagnosis and plan.

## 2018-06-26 ENCOUNTER — APPOINTMENT (OUTPATIENT)
Dept: PHYSICAL THERAPY | Age: 28
End: 2018-06-26
Payer: COMMERCIAL

## 2018-06-28 ENCOUNTER — HOSPITAL ENCOUNTER (OUTPATIENT)
Dept: PHYSICAL THERAPY | Age: 28
Discharge: HOME | End: 2018-06-28
Payer: COMMERCIAL

## 2018-06-28 PROCEDURE — 97110 THERAPEUTIC EXERCISES: CPT

## 2018-06-28 NOTE — PROGRESS NOTES
PT DAILY TREATMENT NOTE - [facility] 2-15    Patient Name: Morgan Jauregui  Date:2018  : 1990  [x]  Patient  Verified  Payor: Collette Felix / Plan: Barbara Pugh / Product Type: HMO /    In time: 1:30P Out time: 2:20P  Total Treatment Time (min): 50  Total Timed Codes (min): 50  1:1 Treatment Time ( only): --   Visit #: 11    Treatment Area: Neck pain [M54.2]  Low back pain [M54.5]    SUBJECTIVE  Pain Level (0-10 scale): 0/10  Any medication changes, allergies to medications, adverse drug reactions, diagnosis change, or new procedure performed?: [x] No    [] Yes (see summary sheet for update)  Subjective functional status/changes:   [] No changes reported  Pt reported working over the weekend doing a lot of lifitng crates. She reported she was not paying attention to her core engagment she had some soreness but it went away with her HEP. Unsure of using weighted bar at the gym.      OBJECTIVE    Modality rationale: decrease pain and increase tissue extensibility to improve the patients ability to decrease LBP   Min Type Additional Details   -- [] Estim: []Att   []Unatt        []TENS instruct                  []IFC  []Premod   []NMES                     []Other:  []w/US   []w/ice   []w/heat  Position:  Location:    []  Traction: [] Cervical       []Lumbar                       [] Prone          []Supine                       []Intermittent   []Continuous Lbs:  [] before manual  [] after manual  []w/heat    []  Ultrasound: []Continuous   [] Pulsed at:                           []1MHz   []3MHz Location:  W/cm2:    [] Paraffin         Location:   []w/heat   -- [x]  Ice     []  Heat  []  Ice massage Position:  Location:    []  Laser  []  Other: Position:  Location:      []  Vasopneumatic Device Pressure:       [] lo [] med [] hi   Temperature:      [x] Skin assessment post-treatment:  [x]intact []redness- no adverse reaction    []redness - adverse reaction:     50 min Therapeutic Exercise:  [x] See flow sheet : Rationale: increase ROM, increase strength and improve coordination to improve the patients ability to increase core activation and stability    -- min Manual Therapy: Sacral extension stretch, R sacral rotation mob, B piriformis release, MET for R anterior pelvic rotation. Rationale: decrease pain, increase ROM, increase tissue extensibility and decrease trigger points to improve the patients ability to increase mobility          With   [] TE   [] TA   [] neuro   [] other: Patient Education: [x] Review HEP    [] Progressed/Changed HEP based on:   [] positioning   [] body mechanics   [] transfers   [] heat/ice application    [] other:      Other Objective/Functional Measures: --    Pain Level (0-10 scale) post treatment: 0/10    ASSESSMENT/Changes in Function:   Assessed technique for dead lifts and back squats using weighted bar. Pt demonstrates increase lumbar extension and delayed in glut firing upon fatigue with back squats. Worked with pt on keeping gluts engaged and drop repetitions and increase sets. Pt demonstrated good form with deadlifts. Pt wants to work with corrective strategies in gym for 1 week then return for 1 more session for possible d/c with PT  Patient will continue to benefit from skilled PT services to modify and progress therapeutic interventions, address functional mobility deficits, address ROM deficits, address strength deficits, analyze and address soft tissue restrictions, analyze and cue movement patterns, analyze and modify body mechanics/ergonomics and assess and modify postural abnormalities to attain remaining goals. [x]  See Plan of Care  []  See progress note/recertification  []  See Discharge Summary         Progress towards goals / Updated goals:  Long Term Goals:  To be accomplished in 3-4 weeks:  1)  Pt will be able to Sit greater than 60 minutes without pain  2) Pt will be able to Stand greater than 60 minutes without increase of pain  3) Pt will be able to Ambulate greater than 1 mile without increase of pain  4) Pt will be able to retrieve item form ground without pain  5) Pt will be able to resume gym routine without pain  6) Pt will be independent with HEP                    PLAN  [x]  Upgrade activities as tolerated     [x]  Continue plan of care  []  Update interventions per flow sheet       []  Discharge due to:_  []  Other:_      Wilma Chua PTA 6/28/2018  5:34 PM

## 2018-07-12 ENCOUNTER — APPOINTMENT (OUTPATIENT)
Dept: PHYSICAL THERAPY | Age: 28
End: 2018-07-12

## 2018-07-17 ENCOUNTER — APPOINTMENT (OUTPATIENT)
Dept: PHYSICAL THERAPY | Age: 28
End: 2018-07-17

## 2018-07-24 ENCOUNTER — APPOINTMENT (OUTPATIENT)
Dept: PHYSICAL THERAPY | Age: 28
End: 2018-07-24

## 2018-07-25 ENCOUNTER — OFFICE VISIT (OUTPATIENT)
Dept: INTERNAL MEDICINE CLINIC | Facility: CLINIC | Age: 28
End: 2018-07-25

## 2018-07-25 VITALS
WEIGHT: 152 LBS | HEIGHT: 66 IN | DIASTOLIC BLOOD PRESSURE: 76 MMHG | TEMPERATURE: 98.2 F | HEART RATE: 71 BPM | BODY MASS INDEX: 24.43 KG/M2 | RESPIRATION RATE: 18 BRPM | SYSTOLIC BLOOD PRESSURE: 125 MMHG

## 2018-07-25 DIAGNOSIS — F90.0 ATTENTION DEFICIT HYPERACTIVITY DISORDER (ADHD), PREDOMINANTLY INATTENTIVE TYPE: ICD-10-CM

## 2018-07-25 RX ORDER — DEXTROAMPHETAMINE SACCHARATE, AMPHETAMINE ASPARTATE, DEXTROAMPHETAMINE SULFATE AND AMPHETAMINE SULFATE 2.5; 2.5; 2.5; 2.5 MG/1; MG/1; MG/1; MG/1
10 TABLET ORAL DAILY
Qty: 60 TAB | Refills: 0 | Status: SHIPPED | OUTPATIENT
Start: 2018-07-25 | End: 2018-07-25 | Stop reason: SDUPTHER

## 2018-07-25 RX ORDER — DEXTROAMPHETAMINE SACCHARATE, AMPHETAMINE ASPARTATE, DEXTROAMPHETAMINE SULFATE AND AMPHETAMINE SULFATE 2.5; 2.5; 2.5; 2.5 MG/1; MG/1; MG/1; MG/1
10 TABLET ORAL 2 TIMES DAILY
Qty: 60 TAB | Refills: 0 | Status: SHIPPED | OUTPATIENT
Start: 2018-07-25 | End: 2018-09-17 | Stop reason: SDUPTHER

## 2018-07-25 NOTE — PATIENT INSTRUCTIONS
PRESCRIPTION REFILL POLICY  Effective 09/69/55    In an effort to ensure that the large volume of prescription refills are processed in the most efficient and expeditious manner, we are asking our patients to assist us by calling your pharmacy for all prescription refills. This will include Mail Order Pharmacies. The pharmacy will contact our office electronically to continue the refill process. Please do not wait until the last minute to call your pharmacy. We require 72 hours (3 days) to fill prescriptions. We also encourage you to call your pharmacy before picking up your prescription to make sure it is ready. With regard to controlled substance refill request (narcotics and many ADD/ADHD treatment prescriptions) and any other prescriptions that need to be picked up at our office, we ask your cooperation by providing us with at least 72 hours (3 days) notice prior to your need of the prescription. You will need to show a valid ID when picking up your prescription. Anyone delegated by you to  your prescriptions must be listed be listed on you HIPPA authorization form, and show a valid ID. Narcotics will not be refilled on a weekend or on a holiday in which the office is closed. We also encourage an alternative method of refill requests, which is through our medically secure web portal, [employer]. This is an efficient and effective way to communicate your requests directly with the office and provider. [employer] can be downloaded onto your smartphone as an [employer]. If you are ready to be connected, please review the attached instructions or speak to any of our staff to get you set up right away! Thank you so much for your cooperation. Should you have any questions, please contact our Practice Administrator, Elizabeth Marsh [phone].

## 2018-07-25 NOTE — PROGRESS NOTES
Chief Complaint   Patient presents with    Medication Refill     1. Have you been to the ER, urgent care clinic since your last visit? Hospitalized since your last visit? No    2. Have you seen or consulted any other health care providers outside of the [address] [place] [address] since your last visit? Include any pap smears or colon screening.  No

## 2018-07-25 NOTE — PROGRESS NOTES
Subjective:      Keysha Lowery is a 33 y.o. female who presents today for ADHD. Mental Health Review  Patient is seen for ADHD. Current treatment regimen includes: Adderall. She is taking it on days when she knows she has tasks to complete, this can be 3-4 times a week and Sunday. Medication compliance: all of the time. Pt reports the following side effects: nothing. She notes that overall it has helped symptoms her control mood and attention, she is getting tasks completed. She notes that she feels like it wears off in the afternoon so she will wait until noon to take it. Patient Active Problem List    Diagnosis Date Noted    ADHD 06/25/2018    Vitamin D deficiency 08/22/2016    Elevated platelet count 66/36/1356    Implanon in place 07/15/2016     Current Outpatient Prescriptions   Medication Sig Dispense Refill    dextroamphetamine-amphetamine (ADDERALL) 10 mg tablet Take 1 Tab (10 mg total) by mouth daily. Max Daily Amount: 10 mg 60 Tab 0    cholecalciferol, VITAMIN D3, (VITAMIN D3) 5,000 unit tab tablet Take 1 Tab by mouth daily. 30 Tab 11     No Known Allergies  No past medical history on file. Review of Systems    A comprehensive review of systems was negative except for that written in the HPI. Objective:     Visit Vitals    /76    Pulse 71    Temp 98.2 °F (36.8 °C) (Oral)    Resp 18    Ht 5' 6\" (1.676 m)    Wt 152 lb (68.9 kg)    BMI 24.53 kg/m2     General appearance: alert, cooperative, no distress, appears stated age  Head: Normocephalic, without obvious abnormality, atraumatic  Eyes: negative  Lungs: clear to auscultation bilaterally  Heart: regular rate and rhythm, S1, S2 normal, no murmur, click, rub or gallop  Neurologic: Grossly normal  Psych: appropriate mood, speech, affect  Nursing note and vitals reviewed  Assessment/Plan:       ICD-10-CM ICD-9-CM    1.  Attention deficit hyperactivity disorder (ADHD), predominantly inattentive type F90.0 314.00 dextroamphetamine-amphetamine (ADDERALL) 10 mg tablet   She will start taking this BID  Follow-up Disposition:  Return for Schedule your annual physical with fasting labs. Due end of August       Advised her to call back or return to office if symptoms worsen/change/persist.  Discussed expected course/resolution/complications of diagnosis in detail with patient. Medication risks/benefits/costs/interactions/alternatives discussed with patient. She was given an after visit summary which includes diagnoses, current medications, & vitals. She expressed understanding with the diagnosis and plan.

## 2018-08-24 ENCOUNTER — OFFICE VISIT (OUTPATIENT)
Dept: INTERNAL MEDICINE CLINIC | Facility: CLINIC | Age: 28
End: 2018-08-24

## 2018-08-24 VITALS
TEMPERATURE: 97.3 F | SYSTOLIC BLOOD PRESSURE: 103 MMHG | RESPIRATION RATE: 18 BRPM | HEIGHT: 66 IN | BODY MASS INDEX: 23.78 KG/M2 | WEIGHT: 148 LBS | DIASTOLIC BLOOD PRESSURE: 61 MMHG | HEART RATE: 64 BPM

## 2018-08-24 DIAGNOSIS — E55.9 VITAMIN D DEFICIENCY: ICD-10-CM

## 2018-08-24 DIAGNOSIS — Z00.00 ENCOUNTER FOR GENERAL ADULT MEDICAL EXAMINATION W/O ABNORMAL FINDINGS: Primary | ICD-10-CM

## 2018-08-24 NOTE — PROGRESS NOTES
Subjective:      Pamela Terry is a 33 y.o. female who presents today for CPE. Health Maintenance  Immunizations:    Influenza: she will plan on getting it this fall. Tetanus: up to date. Gardasil: n/a. Cancer screening:    Cervical: she goes to [facility], reviewed guidelines, UTD, done by Beti, records requested. Breast: reviewed guidelines, reviewed SBE with her, UTD, done by OBGYN, records requested    Patient Care Team:  Darshan Schofield NP as PCP - General (Nurse Practitioner)       The following sections were reviewed & updated as appropriate: PMH, PSH, FH, and SH. Patient Active Problem List   Diagnosis Code    Implanon in place Z97.5    Vitamin D deficiency E55.9    Elevated platelet count X90.79    ADHD F90.9          Prior to Admission medications    Medication Sig Start Date End Date Taking? Authorizing Provider   dextroamphetamine-amphetamine (ADDERALL) 10 mg tablet Take 1 Tab (10 mg total) by mouth two (2) times a day. Max Daily Amount: 20 mg 7/25/18   Darshan Schofield NP   cholecalciferol, VITAMIN D3, (VITAMIN D3) 5,000 unit tab tablet Take 1 Tab by mouth daily. 8/23/17   Darshan Schofield NP          No Known Allergies       No past medical history on file. Past Surgical History:   Procedure Laterality Date    HX WISDOM TEETH EXTRACTION  4/2015     Family History   Problem Relation Age of Onset    Hypertension Mother      Social History   Substance Use Topics    Smoking status: Never Smoker    Smokeless tobacco: Never Used    Alcohol use 2.4 oz/week     4 Cans of beer per week      Comment: weekly        Review of Systems    A comprehensive review of systems was negative except for that written in the HPI. Objective:     Visit Vitals    /61    Pulse 64    Temp 97.3 °F (36.3 °C) (Oral)    Resp 18    Ht 5' 6\" (1.676 m)    Wt 148 lb (67.1 kg)    BMI 23.89 kg/m2     General:  Alert, cooperative, no distress, appears stated age.    Head: Normocephalic, without obvious abnormality, atraumatic. Eyes:  Conjunctivae/corneas clear. PERRL, EOMs intact. Ears:  Normal TMs and external ear canals both ears. Nose: Nares normal. Septum midline. Mucosa normal. No drainage or sinus tenderness. Throat: Lips, mucosa, and tongue normal. Teeth and gums normal.   Neck: Supple, symmetrical, trachea midline, no adenopathy, thyroid: no enlargement/tenderness/nodules, no carotid bruit and no JVD. Back:   Symmetric, no curvature. ROM normal. No CVA tenderness. Lungs:   Clear to auscultation bilaterally. Chest wall:  No tenderness or deformity. Heart:  Regular rate and rhythm, S1, S2 normal, no murmur, click, rub or gallop. Abdomen:   Soft, non-tender. Bowel sounds normal. No masses,  No organomegaly. Extremities: Extremities normal, atraumatic, no cyanosis or edema. Pulses: 2+ and symmetric all extremities. Skin: Skin color, texture, turgor normal. No rashes or lesions. Lymph nodes: Cervical, supraclavicular, and axillary nodes normal.   Neurologic: CNII-XII intact. Normal strength, sensation and reflexes throughout. Nursing note and vitals reviewed  Assessment/Plan:       ICD-10-CM ICD-9-CM    1. Encounter for general adult medical examination w/o abnormal findings Z00.00 V70.9 CBC WITH AUTOMATED DIFF      LIPID PANEL      METABOLIC PANEL, COMPREHENSIVE      VITAMIN D, 25 HYDROXY   2. Vitamin D deficiency E55.9 268.9 VITAMIN D, 25 HYDROXY     Follow-up Disposition:  Return for ADHD. Advised her to call back or return to office if symptoms worsen/change/persist.  Discussed expected course/resolution/complications of diagnosis in detail with patient. Medication risks/benefits/costs/interactions/alternatives discussed with patient. She was given an after visit summary which includes diagnoses, current medications, & vitals. She expressed understanding with the diagnosis and plan.

## 2018-08-24 NOTE — PROGRESS NOTES
Chief Complaint   Patient presents with    Physical     1. Have you been to the ER, urgent care clinic since your last visit? Hospitalized since your last visit? No    2. Have you seen or consulted any other health care providers outside of the [facility] [place] since your last visit? Include any pap smears or colon screening.  No

## 2018-08-24 NOTE — PATIENT INSTRUCTIONS
PRESCRIPTION REFILL POLICY  Effective 35/78/76    In an effort to ensure that the large volume of prescription refills are processed in the most efficient and expeditious manner, we are asking our patients to assist us by calling your pharmacy for all prescription refills. This will include Mail Order Pharmacies. The pharmacy will contact our office electronically to continue the refill process. Please do not wait until the last minute to call your pharmacy. We require 72 hours (3 days) to fill prescriptions. We also encourage you to call your pharmacy before picking up your prescription to make sure it is ready. With regard to controlled substance refill request (narcotics and many ADD/ADHD treatment prescriptions) and any other prescriptions that need to be picked up at our office, we ask your cooperation by providing us with at least 72 hours (3 days) notice prior to your need of the prescription. You will need to show a valid ID when picking up your prescription. Anyone delegated by you to  your prescriptions must be listed be listed on you HIPPA authorization form, and show a valid ID. Narcotics will not be refilled on a weekend or on a holiday in which the office is closed. We also encourage an alternative method of refill requests, which is through our medically secure web portal, [employer]. This is an efficient and effective way to communicate your requests directly with the office and provider. [employer] can be downloaded onto your smartphone as an [place]. If you are ready to be connected, please review the attached instructions or speak to any of our staff to get you set up right away! Thank you so much for your cooperation. Should you have any questions, please contact our Practice Administrator, Carlos Davenport [phone].

## 2018-08-25 LAB
25(OH)D3+25(OH)D2 SERPL-MCNC: 34.7 NG/ML (ref 30–100)
ALBUMIN SERPL-MCNC: 4.7 G/DL (ref 3.5–5.5)
ALBUMIN/GLOB SERPL: 2 {RATIO} (ref 1.2–2.2)
ALP SERPL-CCNC: 43 IU/L (ref 39–117)
ALT SERPL-CCNC: 10 IU/L (ref 0–32)
AST SERPL-CCNC: 20 IU/L (ref 0–40)
BASOPHILS # BLD AUTO: 0 X10E3/UL (ref 0–0.2)
BASOPHILS NFR BLD AUTO: 0 %
BILIRUB SERPL-MCNC: 0.9 MG/DL (ref 0–1.2)
BUN SERPL-MCNC: 8 MG/DL (ref 6–20)
BUN/CREAT SERPL: 11 (ref 9–23)
CALCIUM SERPL-MCNC: 9.5 MG/DL (ref 8.7–10.2)
CHLORIDE SERPL-SCNC: 98 MMOL/L (ref 96–106)
CHOLEST SERPL-MCNC: 161 MG/DL (ref 100–199)
CO2 SERPL-SCNC: 24 MMOL/L (ref 20–29)
CREAT SERPL-MCNC: 0.76 MG/DL (ref 0.57–1)
EOSINOPHIL # BLD AUTO: 0.1 X10E3/UL (ref 0–0.4)
EOSINOPHIL NFR BLD AUTO: 2 %
ERYTHROCYTE [DISTWIDTH] IN BLOOD BY AUTOMATED COUNT: 13.2 % (ref 12.3–15.4)
GLOBULIN SER CALC-MCNC: 2.3 G/DL (ref 1.5–4.5)
GLUCOSE SERPL-MCNC: 84 MG/DL (ref 65–99)
HCT VFR BLD AUTO: 42.4 % (ref 34–46.6)
HDLC SERPL-MCNC: 81 MG/DL
HGB BLD-MCNC: 14.1 G/DL (ref 11.1–15.9)
IMM GRANULOCYTES # BLD: 0 X10E3/UL (ref 0–0.1)
IMM GRANULOCYTES NFR BLD: 0 %
LDLC SERPL CALC-MCNC: 71 MG/DL (ref 0–99)
LYMPHOCYTES # BLD AUTO: 1.8 X10E3/UL (ref 0.7–3.1)
LYMPHOCYTES NFR BLD AUTO: 31 %
MCH RBC QN AUTO: 29.8 PG (ref 26.6–33)
MCHC RBC AUTO-ENTMCNC: 33.3 G/DL (ref 31.5–35.7)
MCV RBC AUTO: 90 FL (ref 79–97)
MONOCYTES # BLD AUTO: 0.5 X10E3/UL (ref 0.1–0.9)
MONOCYTES NFR BLD AUTO: 8 %
NEUTROPHILS # BLD AUTO: 3.4 X10E3/UL (ref 1.4–7)
NEUTROPHILS NFR BLD AUTO: 59 %
PLATELET # BLD AUTO: 443 X10E3/UL (ref 150–379)
POTASSIUM SERPL-SCNC: 5 MMOL/L (ref 3.5–5.2)
PROT SERPL-MCNC: 7 G/DL (ref 6–8.5)
RBC # BLD AUTO: 4.73 X10E6/UL (ref 3.77–5.28)
SODIUM SERPL-SCNC: 137 MMOL/L (ref 134–144)
TRIGL SERPL-MCNC: 47 MG/DL (ref 0–149)
VLDLC SERPL CALC-MCNC: 9 MG/DL (ref 5–40)
WBC # BLD AUTO: 5.9 X10E3/UL (ref 3.4–10.8)

## 2018-08-27 DIAGNOSIS — R79.89 ELEVATED PLATELET COUNT: Primary | ICD-10-CM

## 2018-08-27 NOTE — PROGRESS NOTES
Platelet count elevated, this has been chronic but this is the highest it has been at 443.  Hematology referral placed

## 2018-08-30 ENCOUNTER — DOCUMENTATION ONLY (OUTPATIENT)
Dept: ONCOLOGY | Age: 28
End: 2018-08-30

## 2018-08-30 NOTE — PROGRESS NOTES
Called and left message for pt.   Dr Savanah Catalan is out of the office on 9/7/18, and appt needs to be rescheduled

## 2018-09-17 DIAGNOSIS — F90.0 ATTENTION DEFICIT HYPERACTIVITY DISORDER (ADHD), PREDOMINANTLY INATTENTIVE TYPE: ICD-10-CM

## 2018-09-17 NOTE — TELEPHONE ENCOUNTER
From: Russell Look  To: Adam Sow NP  Sent: 9/17/2018 11:08 AM EDT  Subject: Medication Renewal Request    Original authorizing provider: JERONIMO Pineda would like a refill of the following medications:  dextroamphetamine-amphetamine (ADDERALL) 10 mg tablet Adam Sow NP]    Preferred pharmacy: [facility] [id number]    Comment:

## 2018-09-19 DIAGNOSIS — F90.0 ATTENTION DEFICIT HYPERACTIVITY DISORDER (ADHD), PREDOMINANTLY INATTENTIVE TYPE: ICD-10-CM

## 2018-09-19 RX ORDER — DEXTROAMPHETAMINE SACCHARATE, AMPHETAMINE ASPARTATE, DEXTROAMPHETAMINE SULFATE AND AMPHETAMINE SULFATE 2.5; 2.5; 2.5; 2.5 MG/1; MG/1; MG/1; MG/1
10 TABLET ORAL 2 TIMES DAILY
Qty: 60 TAB | Refills: 0 | Status: SHIPPED | OUTPATIENT
Start: 2018-10-19 | End: 2018-09-19 | Stop reason: SDUPTHER

## 2018-09-19 RX ORDER — DEXTROAMPHETAMINE SACCHARATE, AMPHETAMINE ASPARTATE, DEXTROAMPHETAMINE SULFATE AND AMPHETAMINE SULFATE 2.5; 2.5; 2.5; 2.5 MG/1; MG/1; MG/1; MG/1
10 TABLET ORAL 2 TIMES DAILY
Qty: 60 TAB | Refills: 0 | Status: SHIPPED | OUTPATIENT
Start: 2018-11-19 | End: 2018-12-05

## 2018-09-19 RX ORDER — DEXTROAMPHETAMINE SACCHARATE, AMPHETAMINE ASPARTATE, DEXTROAMPHETAMINE SULFATE AND AMPHETAMINE SULFATE 2.5; 2.5; 2.5; 2.5 MG/1; MG/1; MG/1; MG/1
10 TABLET ORAL 2 TIMES DAILY
Qty: 60 TAB | Refills: 0 | Status: SHIPPED | OUTPATIENT
Start: 2018-09-19 | End: 2018-09-19 | Stop reason: SDUPTHER

## 2018-09-24 ENCOUNTER — OFFICE VISIT (OUTPATIENT)
Dept: ONCOLOGY | Age: 28
End: 2018-09-24

## 2018-09-24 VITALS
WEIGHT: 148.2 LBS | TEMPERATURE: 97.5 F | HEART RATE: 65 BPM | SYSTOLIC BLOOD PRESSURE: 119 MMHG | OXYGEN SATURATION: 98 % | RESPIRATION RATE: 16 BRPM | HEIGHT: 66 IN | BODY MASS INDEX: 23.82 KG/M2 | DIASTOLIC BLOOD PRESSURE: 80 MMHG

## 2018-09-24 DIAGNOSIS — D75.839 THROMBOCYTOSIS: Primary | ICD-10-CM

## 2018-09-24 NOTE — PROGRESS NOTES
Vitaliy Cool is a 33 y.o. female new patient seeing provider today for thrombocytosis. VS stable. Patient denies pain; pt c/o neck discomfort.     Visit Vitals    /80 (BP 1 Location: Left arm, BP Patient Position: Sitting)    Pulse 65    Temp 97.5 °F (36.4 °C) (Oral)    Resp 16    Ht 5' 6\" (1.676 m)    Wt 148 lb 3.2 oz (67.2 kg)    LMP Comment: Kyleena IUD    SpO2 98%    BMI 23.92 kg/m2

## 2018-09-29 LAB
FERRITIN SERPL-MCNC: 99 NG/ML (ref 15–150)
IRON SATN MFR SERPL: 30 % (ref 15–55)
IRON SERPL-MCNC: 89 UG/DL (ref 27–159)
TIBC SERPL-MCNC: 296 UG/DL (ref 250–450)
UIBC SERPL-MCNC: 207 UG/DL (ref 131–425)

## 2018-10-01 NOTE — PROGRESS NOTES
[address] [facility] at [address] [address] [address] [facility], [address] [facility] [phone]    Hematology Consultation      Patient: Donald Solis MRN: [id number]  SSN: [id number]    YOB: 1990  Age: 33 y.o. Sex: female      Subjective:      Donald Solis is a 33 y.o. female who I am seeing in consultation for thrombocytosis. She is asymptomatic. High platelet count was noted in routine laboratory studies. It has been present for a a few months. She denies headache or itching. Review of Systems:    Constitutional: negative  Eyes: negative  Ears, Nose, Mouth, Throat, and Face: negative  Respiratory: negative  Cardiovascular: negative  Gastrointestinal: negative  Genitourinary:negative  Integument/Breast: negative  Hematologic/Lymphatic: negative  Musculoskeletal:negative  Neurological: negative      No past medical history on file. Past Surgical History:   Procedure Laterality Date    HX WISDOM TEETH EXTRACTION  4/2015      Family History   Problem Relation Age of Onset    Hypertension Mother      Social History   Substance Use Topics    Smoking status: Never Smoker    Smokeless tobacco: Never Used    Alcohol use 2.4 oz/week     4 Cans of beer per week      Comment: weekly      Prior to Admission medications    Medication Sig Start Date End Date Taking? Authorizing Provider   dextroamphetamine-amphetamine (ADDERALL) 10 mg tablet Take 1 Tab (10 mg total) by mouth two (2) times a dayEarliest Fill Date: 11/19/18. Max Daily Amount: 20 mg 11/19/18  Yes Ashley Johnson NP   cholecalciferol, VITAMIN D3, (VITAMIN D3) 5,000 unit tab tablet Take 1 Tab by mouth daily.  8/23/17  Yes Ashley Johnson NP              No Known Allergies        Objective:     Vitals:    09/24/18 1546   BP: 119/80   Pulse: 65   Resp: 16   Temp: 97.5 °F (36.4 °C)   TempSrc: Oral   SpO2: 98%   Weight: 148 lb 3.2 oz (67.2 kg)   Height: 5' 6\" (1.676 m)            Physical Exam:    GENERAL: alert, cooperative, no distress, appears stated age  EYE: negative  LYMPHATIC: Cervical, supraclavicular, and axillary nodes normal.   THROAT & NECK: normal and no erythema or exudates noted. LUNG: clear to auscultation bilaterally  HEART: regular rate and rhythm  ABDOMEN: soft, non-tender. EXTREMITIES:  no edema  SKIN: Normal.  NEUROLOGIC: negative           Lab Results   Component Value Date/Time    WBC 5.9 08/24/2018 11:27 AM    HGB 14.1 08/24/2018 11:27 AM    HCT 42.4 08/24/2018 11:27 AM    PLATELET 385 (H) 44/10/0557 11:27 AM    MCV 90 08/24/2018 11:27 AM           Assessment:     1. Thrombocytosis    Mild  Asymptomatic  Likely benign  R/O Iron deficiency  I reassured the patient. If the serial platelet count shows a continuous rise in the number, I would like to see her again in follow up. Until that time CBC could be repeated every 6 months or so. Plan:        1. Return as needed      Signed by: Darylene Betters, MD                     September 30, 2018         CC.  Lotus May NP

## 2018-10-17 NOTE — ANCILLARY DISCHARGE INSTRUCTIONS
[facility]  [address]  Phone: [phone]  Fax: [phone]    Discharge Summary  2-15    Patient name: Melanie Desir  : 1990  Provider#: [id number]  Referral source: Katerina Zayas MD      Medical/Treatment Diagnosis: Neck pain [M54.2]  Low back pain [M54.5]     Prior Hospitalization: see medical history                                      Comorbidities: chronic back pain  Prior Level of Function: complete 20 minutes of exercise at least 3 times a week  Medications: Verified on Patient Summary List     Start of Care: 2018                                                              Onset Date:        Visits from Start of Care: 11     Missed Visits: 0  Reporting Period : 18 to 18    Long Term Goals: To be accomplished in 3-4 weeks:  1)  Pt will be able to Sit greater than 60 minutes without pain NOT MET  2) Pt will be able to Stand greater than 60 minutes without increase of pain NOT MET  3) Pt will be able to Ambulate greater than 1 mile without increase of pain NOT MET  4) Pt will be able to retrieve item form ground without pain MET  5) Pt will be able to resume gym routine without pain MET  6) Pt will be independent with HEP MET      ASSESSMENT/SUMMARY OF CARE: pt has been seen in PT for 11 visits s/p neck and back pain. Treatment included therapeutic exercise, neuromuscular reeducation, manual therapy, modalities, pt education, HEP. Pt progressed well with interventions and was beginning to return to gym program. Pt was going to FU with PT for assessment and possible d/c. Pt failed to return for final visit. D/c from therapy at this time.      RECOMMENDATIONS:  [x]Discontinue therapy: []Patient has reached or is progressing toward set goals      [x]Patient is non-compliant or has abdicated      []Due to lack of appreciable progress towards set goals    Tequila Almeida, PT, DPT 10/17/2018 12:53 PM

## 2018-12-05 ENCOUNTER — OFFICE VISIT (OUTPATIENT)
Dept: INTERNAL MEDICINE CLINIC | Facility: CLINIC | Age: 28
End: 2018-12-05

## 2018-12-05 VITALS
HEIGHT: 66 IN | WEIGHT: 146 LBS | DIASTOLIC BLOOD PRESSURE: 67 MMHG | RESPIRATION RATE: 18 BRPM | OXYGEN SATURATION: 100 % | BODY MASS INDEX: 23.46 KG/M2 | TEMPERATURE: 98.2 F | HEART RATE: 70 BPM | SYSTOLIC BLOOD PRESSURE: 110 MMHG

## 2018-12-05 DIAGNOSIS — F90.0 ATTENTION DEFICIT HYPERACTIVITY DISORDER (ADHD), PREDOMINANTLY INATTENTIVE TYPE: Primary | ICD-10-CM

## 2018-12-05 RX ORDER — DEXTROAMPHETAMINE SACCHARATE, AMPHETAMINE ASPARTATE, DEXTROAMPHETAMINE SULFATE AND AMPHETAMINE SULFATE 2.5; 2.5; 2.5; 2.5 MG/1; MG/1; MG/1; MG/1
10 TABLET ORAL 2 TIMES DAILY
Qty: 60 TAB | Refills: 0 | Status: SHIPPED | OUTPATIENT
Start: 2018-12-05 | End: 2019-01-17 | Stop reason: SDUPTHER

## 2018-12-05 RX ORDER — DEXTROAMPHETAMINE SACCHARATE, AMPHETAMINE ASPARTATE MONOHYDRATE, DEXTROAMPHETAMINE SULFATE AND AMPHETAMINE SULFATE 2.5; 2.5; 2.5; 2.5 MG/1; MG/1; MG/1; MG/1
10 CAPSULE, EXTENDED RELEASE ORAL
COMMUNITY
End: 2018-12-05 | Stop reason: SINTOL

## 2018-12-05 NOTE — PROGRESS NOTES
Subjective:      Tunde Rojas is a 33 y.o. female who presents today for ADHD. Mental Health Review  Patient is seen for ADHD. Current treatment regimen includes: Adderall  Adderall XR. She states this made her have a fuzzy feeling and she did not want to take it. She did better on immediate release adderall but states she does not like the inconvenience of taking it two times a day. Discussed alternative therapies and she agrees to trial vyvanse    Patient Active Problem List    Diagnosis Date Noted    ADHD 06/25/2018    Vitamin D deficiency 08/22/2016    Elevated platelet count 33/41/8021    Implanon in place 07/15/2016     Current Outpatient Medications   Medication Sig Dispense Refill    lisdexamfetamine (VYVANSE) 20 mg capsule Take 1 Cap (20 mg total) by mouth daily. Max Daily Amount: 20 mg 30 Cap 0    dextroamphetamine-amphetamine (ADDERALL) 10 mg tablet Take 1 Tab (10 mg total) by mouth two (2) times a day. Max Daily Amount: 20 mg 60 Tab 0    cholecalciferol, VITAMIN D3, (VITAMIN D3) 5,000 unit tab tablet Take 1 Tab by mouth daily. 30 Tab 11     No Known Allergies  History reviewed. No pertinent past medical history. Past Surgical History:   Procedure Laterality Date    HX WISDOM TEETH EXTRACTION  4/2015        Review of Systems    A comprehensive review of systems was negative except for that written in the HPI.      Objective:     Visit Vitals  /67 (BP 1 Location: Right arm, BP Patient Position: Sitting)   Pulse 70   Temp 98.2 °F (36.8 °C) (Oral)   Resp 18   Ht 5' 6\" (1.676 m)   Wt 146 lb (66.2 kg)   SpO2 100%   BMI 23.57 kg/m²     General appearance: alert, cooperative, no distress, appears stated age  Head: Normocephalic, without obvious abnormality, atraumatic  Eyes: negative  Lungs: clear to auscultation bilaterally  Heart: regular rate and rhythm, S1, S2 normal, no murmur, click, rub or gallop  Neurologic: Grossly normal  Psych: appropriate mood, speech, affect  Nursing note and vitals reviewed  Assessment/Plan:       ICD-10-CM ICD-9-CM    1. Attention deficit hyperactivity disorder (ADHD), predominantly inattentive type F90.0 314.00 lisdexamfetamine (VYVANSE) 20 mg capsule      dextroamphetamine-amphetamine (ADDERALL) 10 mg tablet   She will try to get vyvanse filled but this may require PA, adderall immediate release script printed if that is the case so she has coverage while we work it out with insurance. Follow-up Disposition:  Return in about 3 months (around 3/5/2019). Advised her to call back or return to office if symptoms worsen/change/persist.  Discussed expected course/resolution/complications of diagnosis in detail with patient. Medication risks/benefits/costs/interactions/alternatives discussed with patient. She was given an after visit summary which includes diagnoses, current medications, & vitals. She expressed understanding with the diagnosis and plan.

## 2018-12-05 NOTE — PROGRESS NOTES
Room 4    Chief Complaint   Patient presents with    Medication Evaluation     Adderall     1. Have you been to the ER, urgent care clinic since your last visit? Hospitalized since your last visit? No    2. Have you seen or consulted any other health care providers outside of the [address] [place] [address] since your last visit? Include any pap smears or colon screening.  No

## 2019-01-17 DIAGNOSIS — F90.0 ATTENTION DEFICIT HYPERACTIVITY DISORDER (ADHD), PREDOMINANTLY INATTENTIVE TYPE: ICD-10-CM

## 2019-01-17 RX ORDER — DEXTROAMPHETAMINE SACCHARATE, AMPHETAMINE ASPARTATE, DEXTROAMPHETAMINE SULFATE AND AMPHETAMINE SULFATE 2.5; 2.5; 2.5; 2.5 MG/1; MG/1; MG/1; MG/1
10 TABLET ORAL 2 TIMES DAILY
Qty: 60 TAB | Refills: 0 | Status: SHIPPED | OUTPATIENT
Start: 2019-01-17 | End: 2019-03-18 | Stop reason: SDUPTHER

## 2019-01-17 NOTE — TELEPHONE ENCOUNTER
Patient stated, the Vyvanse is making her more irritated. Therefore, she prefers to go back to taking the 10mg Adderall.

## 2019-03-18 DIAGNOSIS — F90.0 ATTENTION DEFICIT HYPERACTIVITY DISORDER (ADHD), PREDOMINANTLY INATTENTIVE TYPE: ICD-10-CM

## 2019-03-18 RX ORDER — DEXTROAMPHETAMINE SACCHARATE, AMPHETAMINE ASPARTATE, DEXTROAMPHETAMINE SULFATE AND AMPHETAMINE SULFATE 2.5; 2.5; 2.5; 2.5 MG/1; MG/1; MG/1; MG/1
10 TABLET ORAL 2 TIMES DAILY
Qty: 60 TAB | Refills: 0 | Status: SHIPPED | OUTPATIENT
Start: 2019-03-18 | End: 2019-05-23 | Stop reason: SDUPTHER

## 2019-05-23 DIAGNOSIS — F90.0 ATTENTION DEFICIT HYPERACTIVITY DISORDER (ADHD), PREDOMINANTLY INATTENTIVE TYPE: ICD-10-CM

## 2019-05-23 RX ORDER — DEXTROAMPHETAMINE SACCHARATE, AMPHETAMINE ASPARTATE, DEXTROAMPHETAMINE SULFATE AND AMPHETAMINE SULFATE 2.5; 2.5; 2.5; 2.5 MG/1; MG/1; MG/1; MG/1
10 TABLET ORAL 2 TIMES DAILY
Qty: 60 TAB | Refills: 0 | Status: SHIPPED | OUTPATIENT
Start: 2019-05-23 | End: 2019-07-22 | Stop reason: SDUPTHER

## 2019-07-22 DIAGNOSIS — F90.0 ATTENTION DEFICIT HYPERACTIVITY DISORDER (ADHD), PREDOMINANTLY INATTENTIVE TYPE: ICD-10-CM

## 2019-07-22 RX ORDER — DEXTROAMPHETAMINE SACCHARATE, AMPHETAMINE ASPARTATE, DEXTROAMPHETAMINE SULFATE AND AMPHETAMINE SULFATE 2.5; 2.5; 2.5; 2.5 MG/1; MG/1; MG/1; MG/1
10 TABLET ORAL 2 TIMES DAILY
Qty: 60 TAB | Refills: 0 | Status: SHIPPED | OUTPATIENT
Start: 2019-07-22 | End: 2019-07-24 | Stop reason: ALTCHOICE

## 2019-07-24 ENCOUNTER — OFFICE VISIT (OUTPATIENT)
Dept: INTERNAL MEDICINE CLINIC | Facility: CLINIC | Age: 29
End: 2019-07-24

## 2019-07-24 VITALS
DIASTOLIC BLOOD PRESSURE: 71 MMHG | OXYGEN SATURATION: 99 % | WEIGHT: 148.5 LBS | BODY MASS INDEX: 23.87 KG/M2 | RESPIRATION RATE: 16 BRPM | SYSTOLIC BLOOD PRESSURE: 115 MMHG | TEMPERATURE: 97.4 F | HEIGHT: 66 IN | HEART RATE: 72 BPM

## 2019-07-24 DIAGNOSIS — Z00.00 ROUTINE PHYSICAL EXAMINATION: ICD-10-CM

## 2019-07-24 DIAGNOSIS — E55.9 VITAMIN D DEFICIENCY: ICD-10-CM

## 2019-07-24 DIAGNOSIS — F90.0 ATTENTION DEFICIT HYPERACTIVITY DISORDER (ADHD), PREDOMINANTLY INATTENTIVE TYPE: Primary | ICD-10-CM

## 2019-07-24 RX ORDER — DEXTROAMPHETAMINE SACCHARATE, AMPHETAMINE ASPARTATE MONOHYDRATE, DEXTROAMPHETAMINE SULFATE AND AMPHETAMINE SULFATE 5; 5; 5; 5 MG/1; MG/1; MG/1; MG/1
20 CAPSULE, EXTENDED RELEASE ORAL
Qty: 30 CAP | Refills: 0 | Status: SHIPPED | OUTPATIENT
Start: 2019-07-24 | End: 2019-08-22 | Stop reason: SDUPTHER

## 2019-07-24 NOTE — PROGRESS NOTES
Identified pt with two pt identifiers(name and ). Reviewed record in preparation for visit and have obtained necessary documentation. Chief Complaint   Patient presents with    Medication Evaluation        Health Maintenance Due   Topic    PAP AKA CERVICAL CYTOLOGY        Coordination of Care Questionnaire:  :   1) Have you been to an emergency room, urgent care, or hospitalized since your last visit? If yes, where when, and reason for visit? no     2. Have seen or consulted any other health care provider other than [facility] since your last visit? If yes, where when, and reason for visit? NO    3) Do you have an Advanced Directive/ Living Will in place? NO  If yes, do we have a copy on file NO  If no, would you like information NO    Patient is accompanied by self I have received verbal consent from Jocelyne Brandt to discuss any/all medical information while they are present in the room.     Visit Vitals  /71 (BP 1 Location: Left arm, BP Patient Position: Sitting)   Pulse 72   Temp 97.4 °F (36.3 °C) (Oral)   Resp 16   Ht 5' 6\" (1.676 m)   Wt 148 lb 8 oz (67.4 kg)   SpO2 99%   BMI 23.97 kg/m²     Johanna Pagan LPN

## 2019-07-24 NOTE — PROGRESS NOTES
Subjective:      Pineda Mccurdy is a 33 y.o. female who presents today for follow up. Mental Health Review  Patient is seen for ADHD. Current treatment regimen includes: Adderall 10mg which she is not sure that it works for her. She feels tired often after coming off it. She notes side effects with the vyvanse of chest discomfort. She is willing to try the adderall XR again but noes the 10mg did not do anything. Medication compliance: all of the time. Pt reports the following side effects: fatigue. [facility]  reviewed. Patient Active Problem List    Diagnosis Date Noted    ADHD 06/25/2018    Vitamin D deficiency 08/22/2016    Elevated platelet count 29/58/3998    Implanon in place 07/15/2016     Current Outpatient Medications   Medication Sig Dispense Refill    dextroamphetamine-amphetamine (ADDERALL) 10 mg tablet Take 1 Tab by mouth two (2) times a day. Max Daily Amount: 20 mg. 60 Tab 0    lisdexamfetamine (VYVANSE) 20 mg capsule Take 1 Cap (20 mg total) by mouth daily. Max Daily Amount: 20 mg 30 Cap 0    cholecalciferol, VITAMIN D3, (VITAMIN D3) 5,000 unit tab tablet Take 1 Tab by mouth daily. 30 Tab 11     No Known Allergies  No past medical history on file. Past Surgical History:   Procedure Laterality Date    HX WISDOM TEETH EXTRACTION  4/2015        Review of Systems    A comprehensive review of systems was negative except for that written in the HPI.      Objective:     Visit Vitals  /71 (BP 1 Location: Left arm, BP Patient Position: Sitting)   Pulse 72   Temp 97.4 °F (36.3 °C) (Oral)   Resp 16   Ht 5' 6\" (1.676 m)   Wt 148 lb 8 oz (67.4 kg)   SpO2 99%   BMI 23.97 kg/m²     General appearance: alert, cooperative, no distress, appears stated age  Head: Normocephalic, without obvious abnormality, atraumatic  Eyes: negative  Lungs: clear to auscultation bilaterally  Heart: regular rate and rhythm, S1, S2 normal, no murmur, click, rub or gallop  Neurologic: Grossly normal  Psych: appropriate mood, speech, affect    Nursing note and vitals reviewed  Assessment/Plan:       ICD-10-CM ICD-9-CM    1. Attention deficit hyperactivity disorder (ADHD), predominantly inattentive type F90.0 314.00 amphetamine-dextroamphetamine XR (ADDERALL XR) 20 mg XR capsule   2. Routine physical examination Z00.00 V70.0 CBC WITH AUTOMATED DIFF      LIPID PANEL      METABOLIC PANEL, COMPREHENSIVE      VITAMIN D, 25 HYDROXY   3. Vitamin D deficiency E55.9 268.9 VITAMIN D, 25 HYDROXY     She will get labs and schedule CPE. She will trial adderall XR and let me know what works best for her for all her for refills. Follow-up and Dispositions    · Return for Schedule your physical, get labs done beforehand. Advised her to call back or return to office if symptoms worsen/change/persist.  Discussed expected course/resolution/complications of diagnosis in detail with patient. Medication risks/benefits/costs/interactions/alternatives discussed with patient. She was given an after visit summary which includes diagnoses, current medications, & vitals. She expressed understanding with the diagnosis and plan.

## 2019-08-15 LAB
25(OH)D3+25(OH)D2 SERPL-MCNC: 43.5 NG/ML (ref 30–100)
ALBUMIN SERPL-MCNC: 4.9 G/DL (ref 3.5–5.5)
ALBUMIN/GLOB SERPL: 1.9 {RATIO} (ref 1.2–2.2)
ALP SERPL-CCNC: 40 IU/L (ref 39–117)
ALT SERPL-CCNC: 11 IU/L (ref 0–32)
AST SERPL-CCNC: 15 IU/L (ref 0–40)
BASOPHILS # BLD AUTO: 0 X10E3/UL (ref 0–0.2)
BASOPHILS NFR BLD AUTO: 1 %
BILIRUB SERPL-MCNC: 0.6 MG/DL (ref 0–1.2)
BUN SERPL-MCNC: 11 MG/DL (ref 6–20)
BUN/CREAT SERPL: 13 (ref 9–23)
CALCIUM SERPL-MCNC: 10.2 MG/DL (ref 8.7–10.2)
CHLORIDE SERPL-SCNC: 100 MMOL/L (ref 96–106)
CHOLEST SERPL-MCNC: 170 MG/DL (ref 100–199)
CO2 SERPL-SCNC: 25 MMOL/L (ref 20–29)
CREAT SERPL-MCNC: 0.83 MG/DL (ref 0.57–1)
EOSINOPHIL # BLD AUTO: 0.2 X10E3/UL (ref 0–0.4)
EOSINOPHIL NFR BLD AUTO: 4 %
ERYTHROCYTE [DISTWIDTH] IN BLOOD BY AUTOMATED COUNT: 13.2 % (ref 12.3–15.4)
GLOBULIN SER CALC-MCNC: 2.6 G/DL (ref 1.5–4.5)
GLUCOSE SERPL-MCNC: 87 MG/DL (ref 65–99)
HCT VFR BLD AUTO: 46.5 % (ref 34–46.6)
HDLC SERPL-MCNC: 91 MG/DL
HGB BLD-MCNC: 15.6 G/DL (ref 11.1–15.9)
IMM GRANULOCYTES # BLD AUTO: 0 X10E3/UL (ref 0–0.1)
IMM GRANULOCYTES NFR BLD AUTO: 0 %
LDLC SERPL CALC-MCNC: 68 MG/DL (ref 0–99)
LYMPHOCYTES # BLD AUTO: 1.9 X10E3/UL (ref 0.7–3.1)
LYMPHOCYTES NFR BLD AUTO: 42 %
MCH RBC QN AUTO: 30.2 PG (ref 26.6–33)
MCHC RBC AUTO-ENTMCNC: 33.5 G/DL (ref 31.5–35.7)
MCV RBC AUTO: 90 FL (ref 79–97)
MONOCYTES # BLD AUTO: 0.4 X10E3/UL (ref 0.1–0.9)
MONOCYTES NFR BLD AUTO: 8 %
NEUTROPHILS # BLD AUTO: 2 X10E3/UL (ref 1.4–7)
NEUTROPHILS NFR BLD AUTO: 45 %
PLATELET # BLD AUTO: 387 X10E3/UL (ref 150–450)
POTASSIUM SERPL-SCNC: 5.1 MMOL/L (ref 3.5–5.2)
PROT SERPL-MCNC: 7.5 G/DL (ref 6–8.5)
RBC # BLD AUTO: 5.17 X10E6/UL (ref 3.77–5.28)
SODIUM SERPL-SCNC: 138 MMOL/L (ref 134–144)
TRIGL SERPL-MCNC: 56 MG/DL (ref 0–149)
VLDLC SERPL CALC-MCNC: 11 MG/DL (ref 5–40)
WBC # BLD AUTO: 4.6 X10E3/UL (ref 3.4–10.8)

## 2019-08-22 ENCOUNTER — OFFICE VISIT (OUTPATIENT)
Dept: INTERNAL MEDICINE CLINIC | Facility: CLINIC | Age: 29
End: 2019-08-22

## 2019-08-22 VITALS
WEIGHT: 148 LBS | HEIGHT: 66 IN | TEMPERATURE: 97.9 F | RESPIRATION RATE: 16 BRPM | HEART RATE: 61 BPM | BODY MASS INDEX: 23.78 KG/M2 | SYSTOLIC BLOOD PRESSURE: 97 MMHG | DIASTOLIC BLOOD PRESSURE: 62 MMHG

## 2019-08-22 DIAGNOSIS — Z00.00 ROUTINE PHYSICAL EXAMINATION: Primary | ICD-10-CM

## 2019-08-22 DIAGNOSIS — F90.0 ATTENTION DEFICIT HYPERACTIVITY DISORDER (ADHD), PREDOMINANTLY INATTENTIVE TYPE: ICD-10-CM

## 2019-08-22 RX ORDER — DEXTROAMPHETAMINE SACCHARATE, AMPHETAMINE ASPARTATE, DEXTROAMPHETAMINE SULFATE AND AMPHETAMINE SULFATE 2.5; 2.5; 2.5; 2.5 MG/1; MG/1; MG/1; MG/1
10 TABLET ORAL DAILY
Qty: 30 TAB | Refills: 0 | Status: SHIPPED | OUTPATIENT
Start: 2019-09-22 | End: 2019-08-22 | Stop reason: SDUPTHER

## 2019-08-22 RX ORDER — DEXTROAMPHETAMINE SACCHARATE, AMPHETAMINE ASPARTATE MONOHYDRATE, DEXTROAMPHETAMINE SULFATE AND AMPHETAMINE SULFATE 5; 5; 5; 5 MG/1; MG/1; MG/1; MG/1
20 CAPSULE, EXTENDED RELEASE ORAL
Qty: 30 CAP | Refills: 0 | Status: SHIPPED | OUTPATIENT
Start: 2019-08-23 | End: 2019-08-22 | Stop reason: SDUPTHER

## 2019-08-22 RX ORDER — DEXTROAMPHETAMINE SACCHARATE, AMPHETAMINE ASPARTATE, DEXTROAMPHETAMINE SULFATE AND AMPHETAMINE SULFATE 2.5; 2.5; 2.5; 2.5 MG/1; MG/1; MG/1; MG/1
10 TABLET ORAL DAILY
Qty: 30 TAB | Refills: 0 | Status: SHIPPED | OUTPATIENT
Start: 2019-10-22 | End: 2020-01-03 | Stop reason: SDUPTHER

## 2019-08-22 RX ORDER — DEXTROAMPHETAMINE SACCHARATE, AMPHETAMINE ASPARTATE MONOHYDRATE, DEXTROAMPHETAMINE SULFATE AND AMPHETAMINE SULFATE 5; 5; 5; 5 MG/1; MG/1; MG/1; MG/1
20 CAPSULE, EXTENDED RELEASE ORAL
Qty: 30 CAP | Refills: 0 | Status: SHIPPED | OUTPATIENT
Start: 2019-10-23 | End: 2020-01-03 | Stop reason: SDUPTHER

## 2019-08-22 RX ORDER — DEXTROAMPHETAMINE SACCHARATE, AMPHETAMINE ASPARTATE, DEXTROAMPHETAMINE SULFATE AND AMPHETAMINE SULFATE 2.5; 2.5; 2.5; 2.5 MG/1; MG/1; MG/1; MG/1
10 TABLET ORAL DAILY
Qty: 30 TAB | Refills: 0 | Status: SHIPPED | OUTPATIENT
Start: 2019-08-22 | End: 2019-08-22 | Stop reason: SDUPTHER

## 2019-08-22 RX ORDER — DEXTROAMPHETAMINE SACCHARATE, AMPHETAMINE ASPARTATE MONOHYDRATE, DEXTROAMPHETAMINE SULFATE AND AMPHETAMINE SULFATE 5; 5; 5; 5 MG/1; MG/1; MG/1; MG/1
20 CAPSULE, EXTENDED RELEASE ORAL
Qty: 30 CAP | Refills: 0 | Status: SHIPPED | OUTPATIENT
Start: 2019-09-23 | End: 2019-08-22 | Stop reason: SDUPTHER

## 2019-08-22 NOTE — PROGRESS NOTES
Subjective:      Nica Figueroa is a 33 y.o. female who presents today for CPE. Health Maintenance  Immunizations:    Influenza: n/a. Tetanus: up to date. Gardasil: n/a. Cancer screening:   Cervical: reviewed guidelines, UTD, done by OBGYN, records requested. Breast: reviewed guidelines, reviewed SBE with her, UTD, done by OBGYN, records requested    Chest pressure: only with laying supine, she states it happened a few months ago on a few separate occasions. She has been asymptomatic for over a month. She denies chest pain, SOB, cough, congestion, reflux. Right hand pain: she has been crocheting \"a lot recently\". She notes pain to right hand lateral right side and some pain to wrist with movement. She notes ice has helped and taking a break from chelly. She denies symptoms today. Mental Health Review  Patient is seen for ADHD. Current treatment regimen includes: Adderall XR. Medication compliance: all of the time. She requests to start an immediate release adderall on days when she does not want to take a XR, she states there are some times on weekends when she only needs a shorter acting script. Pt reports the following side effects: nothing. [facility]  reviewed. Patient Care Team:  Skiff, Metta Moorman, NP as PCP - General (Nurse Practitioner)       The following sections were reviewed & updated as appropriate: PMH, PSH, FH, and SH. Patient Active Problem List   Diagnosis Code    Implanon in place Z97.5    Vitamin D deficiency E55.9    Elevated platelet count M78.59    ADHD F90.9          Prior to Admission medications    Medication Sig Start Date End Date Taking?  Authorizing Provider   levonorgestrel [facility]) 17.5 mcg/24 hrs (5 yrs) 19.5 mg IUD IUD Kyleena 17.5 mcg/24 hrs (5yrs) 19.5mg intrauterine device   insertion 10-27-17 10/27/17   Provider, Historical   omega-3s/dha/epa/fish oil/D3 (VITAMIN-D + OMEGA-3 PO) Vitamin D    Provider, Historical   amphetamine-dextroamphetamine XR (ADDERALL XR) 20 mg XR capsule Take 1 Cap by mouth every morning. Max Daily Amount: 20 mg. 7/24/19   Skiff, Metta Moorman, JERONIMO   cholecalciferol, VITAMIN D3, (VITAMIN D3) 5,000 unit tab tablet Take 1 Tab by mouth daily. 8/23/17   Skiff, Metta Moorman, NP          No Known Allergies   Review of Systems    A comprehensive review of systems was negative except for that written in the HPI. Objective:     Visit Vitals  BP 97/62   Pulse 61   Temp 97.9 °F (36.6 °C) (Oral)   Resp 16   Ht 5' 6\" (1.676 m)   Wt 148 lb (67.1 kg)   BMI 23.89 kg/m²     General:  Alert, cooperative, no distress, appears stated age. Head:  Normocephalic, without obvious abnormality, atraumatic. Eyes:  Conjunctivae/corneas clear. PERRL, EOMs intact. Fundi benign. Ears:  Normal TMs and external ear canals both ears. Nose: Nares normal. Septum midline. Mucosa normal. No drainage or sinus tenderness. Throat: Lips, mucosa, and tongue normal. Teeth and gums normal.   Neck: Supple, symmetrical, trachea midline, no adenopathy, thyroid: no enlargement/tenderness/nodules, no carotid bruit and no JVD. Back:   Symmetric, no curvature. ROM normal. No CVA tenderness. Lungs:   Clear to auscultation bilaterally. Chest wall:  No tenderness or deformity. Heart:  Regular rate and rhythm, S1, S2 normal, no murmur, click, rub or gallop. Abdomen:   Soft, non-tender. Bowel sounds normal. No masses,  No organomegaly. Extremities: Extremities normal, atraumatic, no cyanosis or edema. Pulses: 2+ and symmetric all extremities. Skin: Skin color, texture, turgor normal. No rashes or lesions. Lymph nodes: Cervical, supraclavicular, and axillary nodes normal.   Neurologic: CNII-XII intact. Normal strength, sensation and reflexes throughout. Nursing note and vitals reviewed  Assessment/Plan:       ICD-10-CM ICD-9-CM    1. Routine physical examination Z00.00 V70.0    2.  Attention deficit hyperactivity disorder (ADHD), predominantly inattentive type F90.0 314.00 dextroamphetamine-amphetamine (ADDERALL) 10 mg tablet      amphetamine-dextroamphetamine XR (ADDERALL XR) 20 mg XR capsule      DISCONTINUED: dextroamphetamine-amphetamine (ADDERALL) 10 mg tablet      DISCONTINUED: dextroamphetamine-amphetamine (ADDERALL) 10 mg tablet      DISCONTINUED: amphetamine-dextroamphetamine XR (ADDERALL XR) 20 mg XR capsule      DISCONTINUED: amphetamine-dextroamphetamine XR (ADDERALL XR) 20 mg XR capsule          Follow-up and Dispositions    · Return in about 6 months (around 2/22/2020) for ADHD. message me if any of those symptoms continue that we talked about today. Advised her to call back or return to office if symptoms worsen/change/persist.  Discussed expected course/resolution/complications of diagnosis in detail with patient. Medication risks/benefits/costs/interactions/alternatives discussed with patient. She was given an after visit summary which includes diagnoses, current medications, & vitals. She expressed understanding with the diagnosis and plan.

## 2019-08-22 NOTE — PROGRESS NOTES
Chief Complaint   Patient presents with    Physical     1. Have you been to the ER, urgent care clinic since your last visit? Hospitalized since your last visit? No    2. Have you seen or consulted any other health care providers outside of the [address] [place] [address] since your last visit? Include any pap smears or colon screening.  No

## 2020-01-03 ENCOUNTER — OFFICE VISIT (OUTPATIENT)
Dept: INTERNAL MEDICINE CLINIC | Age: 30
End: 2020-01-03

## 2020-01-03 VITALS
HEIGHT: 66 IN | DIASTOLIC BLOOD PRESSURE: 60 MMHG | RESPIRATION RATE: 14 BRPM | HEART RATE: 76 BPM | TEMPERATURE: 98.5 F | OXYGEN SATURATION: 98 % | WEIGHT: 146 LBS | SYSTOLIC BLOOD PRESSURE: 100 MMHG | BODY MASS INDEX: 23.46 KG/M2

## 2020-01-03 DIAGNOSIS — F41.9 ANXIETY: Primary | ICD-10-CM

## 2020-01-03 DIAGNOSIS — F90.0 ATTENTION DEFICIT HYPERACTIVITY DISORDER (ADHD), PREDOMINANTLY INATTENTIVE TYPE: ICD-10-CM

## 2020-01-03 RX ORDER — SERTRALINE HYDROCHLORIDE 25 MG/1
25 TABLET, FILM COATED ORAL DAILY
Qty: 30 TAB | Refills: 0 | Status: SHIPPED | OUTPATIENT
Start: 2020-01-03 | End: 2020-02-03 | Stop reason: SDUPTHER

## 2020-01-03 RX ORDER — DEXTROAMPHETAMINE SACCHARATE, AMPHETAMINE ASPARTATE, DEXTROAMPHETAMINE SULFATE AND AMPHETAMINE SULFATE 2.5; 2.5; 2.5; 2.5 MG/1; MG/1; MG/1; MG/1
10 TABLET ORAL DAILY
Qty: 30 TAB | Refills: 0 | Status: SHIPPED | OUTPATIENT
Start: 2020-01-03 | End: 2020-03-27 | Stop reason: SDUPTHER

## 2020-01-03 RX ORDER — DEXTROAMPHETAMINE SACCHARATE, AMPHETAMINE ASPARTATE MONOHYDRATE, DEXTROAMPHETAMINE SULFATE AND AMPHETAMINE SULFATE 5; 5; 5; 5 MG/1; MG/1; MG/1; MG/1
20 CAPSULE, EXTENDED RELEASE ORAL
Qty: 30 CAP | Refills: 0 | Status: SHIPPED | OUTPATIENT
Start: 2020-01-03 | End: 2020-02-03 | Stop reason: SDUPTHER

## 2020-01-03 NOTE — PROGRESS NOTES
HISTORY OF PRESENT ILLNESS  Blanca Lindsey is a 30 y.o. female. Patient here for ADHD follow up and reports worsening anxiety and possible depression over the past few months. She states she has been experiencing increased job and family stress recently. There have been days where she lacks motivation to leave the house. Patient started seeing therapist Sal Gutierrez in December. She has difficulty sleeping at times and waking up frequently with racing thoughts. No chest pain or palpitations. Patient states Adderall works well for her, but she takes the XR in the morning and finds around 2 pm 2-3 days per week she needs to take the immediate release to stay focused. Sometimes on weekends she just takes the IR. She is not looking to change medication at this time. Visit Vitals  /60 (BP 1 Location: Left arm, BP Patient Position: Sitting)   Pulse 76   Temp 98.5 °F (36.9 °C) (Oral)   Resp 14   Ht 5' 6\" (1.676 m)   Wt 146 lb (66.2 kg)   SpO2 98%   BMI 23.57 kg/m²       HPI    Review of Systems   Psychiatric/Behavioral: Positive for depression. The patient is nervous/anxious. Physical Exam  Constitutional:       Appearance: Normal appearance. Cardiovascular:      Rate and Rhythm: Normal rate and regular rhythm. Pulmonary:      Effort: Pulmonary effort is normal.      Breath sounds: Normal breath sounds. Neurological:      General: No focal deficit present. Mental Status: She is alert and oriented to person, place, and time. Psychiatric:         Attention and Perception: Attention normal.         Mood and Affect: Mood is anxious. Speech: Speech normal.         Behavior: Behavior normal.         Thought Content: Thought content normal.         Cognition and Memory: Cognition normal.         Judgment: Judgment normal.         ASSESSMENT and PLAN    ICD-10-CM ICD-9-CM    1. Anxiety F41.9 300.00 sertraline (ZOLOFT) 25 mg tablet   2.  Attention deficit hyperactivity disorder (ADHD), predominantly inattentive type F90.0 314.00 dextroamphetamine-amphetamine (ADDERALL) 10 mg tablet      amphetamine-dextroamphetamine XR (ADDERALL XR) 20 mg XR capsule     Orders Placed This Encounter    sertraline (ZOLOFT) 25 mg tablet    dextroamphetamine-amphetamine (ADDERALL) 10 mg tablet    amphetamine-dextroamphetamine XR (ADDERALL XR) 20 mg XR capsule   will start zoloft  Continue follow up with therapist  Follow up in 1 month regarding anxiety/depression

## 2020-01-03 NOTE — PROGRESS NOTES
Chief Complaint   Patient presents with    Medication Evaluation     Reviewed record in preparation for visit and have obtained necessary documentation. Identified pt with two pt identifiers(name and ). Health Maintenance Due   Topic    PAP AKA CERVICAL CYTOLOGY     Influenza Age 5 to Adult          Chief Complaint   Patient presents with    Medication Evaluation        Wt Readings from Last 3 Encounters:   20 146 lb (66.2 kg)   19 148 lb (67.1 kg)   19 148 lb 8 oz (67.4 kg)     Temp Readings from Last 3 Encounters:   20 98.5 °F (36.9 °C) (Oral)   19 97.9 °F (36.6 °C) (Oral)   19 97.4 °F (36.3 °C) (Oral)     BP Readings from Last 3 Encounters:   20 100/60   19 97/62   19 115/71     Pulse Readings from Last 3 Encounters:   20 76   19 61   19 72           Learning Assessment:  :     Learning Assessment 7/15/2016   PRIMARY LEARNER Patient   HIGHEST LEVEL OF EDUCATION - PRIMARY LEARNER  > 4 YEARS OF COLLEGE   BARRIERS PRIMARY LEARNER NONE   PRIMARY LANGUAGE ENGLISH   LEARNER PREFERENCE PRIMARY LISTENING     READING     DEMONSTRATION     PICTURES     VIDEOS   ANSWERED BY patient   RELATIONSHIP SELF       Depression Screening:  :     3 most recent PHQ Screens 2019   Little interest or pleasure in doing things Not at all   Feeling down, depressed, irritable, or hopeless Not at all   Total Score PHQ 2 0       Fall Risk Assessment:  :     No flowsheet data found. Abuse Screening:  :     No flowsheet data found.     Coordination of Care Questionnaire:  :     1) Have you been to an emergency room, urgent care clinic since your last visit? no   Hospitalized since your last visit? no             2) Have you seen or consulted any other health care providers outside of [address] [place] [address] since your last visit? no  (Include any pap smears or colon screenings in this section.)    3) Do you have an Advance Directive on file? no    4) Are you interested in receiving information on Advance Directives? NO      Patient is accompanied by self I have received verbal consent from Lucia Thapa to discuss any/all medical information while they are present in the room. Reviewed record  In preparation for visit and have obtained necessary documentation.

## 2020-02-03 DIAGNOSIS — F90.0 ATTENTION DEFICIT HYPERACTIVITY DISORDER (ADHD), PREDOMINANTLY INATTENTIVE TYPE: ICD-10-CM

## 2020-02-03 DIAGNOSIS — F41.9 ANXIETY: ICD-10-CM

## 2020-02-03 RX ORDER — SERTRALINE HYDROCHLORIDE 25 MG/1
25 TABLET, FILM COATED ORAL DAILY
Qty: 30 TAB | Refills: 0 | Status: SHIPPED | OUTPATIENT
Start: 2020-02-03 | End: 2020-02-04 | Stop reason: DRUGHIGH

## 2020-02-03 RX ORDER — DEXTROAMPHETAMINE SACCHARATE, AMPHETAMINE ASPARTATE MONOHYDRATE, DEXTROAMPHETAMINE SULFATE AND AMPHETAMINE SULFATE 5; 5; 5; 5 MG/1; MG/1; MG/1; MG/1
20 CAPSULE, EXTENDED RELEASE ORAL
Qty: 30 CAP | Refills: 0 | Status: SHIPPED | OUTPATIENT
Start: 2020-02-03 | End: 2020-03-09 | Stop reason: SDUPTHER

## 2020-02-03 NOTE — TELEPHONE ENCOUNTER
Karine Santos ([facility]) [phone] (H)     Refill on zoloft and adderall to [facility]

## 2020-02-04 DIAGNOSIS — F41.9 ANXIETY: Primary | ICD-10-CM

## 2020-02-04 RX ORDER — SERTRALINE HYDROCHLORIDE 50 MG/1
50 TABLET, FILM COATED ORAL DAILY
Qty: 90 TAB | Refills: 1 | Status: SHIPPED | OUTPATIENT
Start: 2020-02-04 | End: 2020-02-28 | Stop reason: SDUPTHER

## 2020-02-28 DIAGNOSIS — F41.9 ANXIETY: ICD-10-CM

## 2020-02-28 RX ORDER — SERTRALINE HYDROCHLORIDE 50 MG/1
50 TABLET, FILM COATED ORAL DAILY
Qty: 90 TAB | Refills: 1 | Status: SHIPPED | OUTPATIENT
Start: 2020-02-28 | End: 2020-06-29 | Stop reason: ALTCHOICE

## 2020-02-28 NOTE — TELEPHONE ENCOUNTER
Orders Placed This Encounter    sertraline (ZOLOFT) 50 mg tablet     Sig: Take 1 Tab by mouth daily.      Dispense:  90 Tab     Refill:  1     The above orders were approved via [facility] per Aye Schuler NP

## 2020-03-09 DIAGNOSIS — F90.0 ATTENTION DEFICIT HYPERACTIVITY DISORDER (ADHD), PREDOMINANTLY INATTENTIVE TYPE: ICD-10-CM

## 2020-03-09 RX ORDER — DEXTROAMPHETAMINE SACCHARATE, AMPHETAMINE ASPARTATE MONOHYDRATE, DEXTROAMPHETAMINE SULFATE AND AMPHETAMINE SULFATE 5; 5; 5; 5 MG/1; MG/1; MG/1; MG/1
20 CAPSULE, EXTENDED RELEASE ORAL
Qty: 30 CAP | Refills: 0 | Status: SHIPPED | OUTPATIENT
Start: 2020-03-09 | End: 2020-03-27 | Stop reason: SDUPTHER

## 2020-03-27 DIAGNOSIS — F90.0 ATTENTION DEFICIT HYPERACTIVITY DISORDER (ADHD), PREDOMINANTLY INATTENTIVE TYPE: ICD-10-CM

## 2020-03-27 RX ORDER — DEXTROAMPHETAMINE SACCHARATE, AMPHETAMINE ASPARTATE MONOHYDRATE, DEXTROAMPHETAMINE SULFATE AND AMPHETAMINE SULFATE 5; 5; 5; 5 MG/1; MG/1; MG/1; MG/1
20 CAPSULE, EXTENDED RELEASE ORAL
Qty: 30 CAP | Refills: 0 | Status: SHIPPED | OUTPATIENT
Start: 2020-04-08 | End: 2020-05-08 | Stop reason: SDUPTHER

## 2020-03-27 RX ORDER — DEXTROAMPHETAMINE SACCHARATE, AMPHETAMINE ASPARTATE, DEXTROAMPHETAMINE SULFATE AND AMPHETAMINE SULFATE 2.5; 2.5; 2.5; 2.5 MG/1; MG/1; MG/1; MG/1
10 TABLET ORAL DAILY
Qty: 30 TAB | Refills: 0 | Status: SHIPPED | OUTPATIENT
Start: 2020-03-27 | End: 2020-06-08 | Stop reason: SDUPTHER

## 2020-05-08 DIAGNOSIS — F90.0 ATTENTION DEFICIT HYPERACTIVITY DISORDER (ADHD), PREDOMINANTLY INATTENTIVE TYPE: ICD-10-CM

## 2020-05-08 RX ORDER — DEXTROAMPHETAMINE SACCHARATE, AMPHETAMINE ASPARTATE MONOHYDRATE, DEXTROAMPHETAMINE SULFATE AND AMPHETAMINE SULFATE 5; 5; 5; 5 MG/1; MG/1; MG/1; MG/1
20 CAPSULE, EXTENDED RELEASE ORAL
Qty: 30 CAP | Refills: 0 | Status: SHIPPED | OUTPATIENT
Start: 2020-05-08 | End: 2020-06-08 | Stop reason: SDUPTHER

## 2020-06-08 DIAGNOSIS — F90.0 ATTENTION DEFICIT HYPERACTIVITY DISORDER (ADHD), PREDOMINANTLY INATTENTIVE TYPE: ICD-10-CM

## 2020-06-08 RX ORDER — DEXTROAMPHETAMINE SACCHARATE, AMPHETAMINE ASPARTATE MONOHYDRATE, DEXTROAMPHETAMINE SULFATE AND AMPHETAMINE SULFATE 5; 5; 5; 5 MG/1; MG/1; MG/1; MG/1
20 CAPSULE, EXTENDED RELEASE ORAL
Qty: 30 CAP | Refills: 0 | Status: SHIPPED | OUTPATIENT
Start: 2020-06-08 | End: 2020-07-14 | Stop reason: SDUPTHER

## 2020-06-08 RX ORDER — DEXTROAMPHETAMINE SACCHARATE, AMPHETAMINE ASPARTATE, DEXTROAMPHETAMINE SULFATE AND AMPHETAMINE SULFATE 2.5; 2.5; 2.5; 2.5 MG/1; MG/1; MG/1; MG/1
10 TABLET ORAL DAILY
Qty: 30 TAB | Refills: 0 | Status: SHIPPED | OUTPATIENT
Start: 2020-06-08 | End: 2020-07-14 | Stop reason: SDUPTHER

## 2020-06-26 ENCOUNTER — E-VISIT (OUTPATIENT)
Dept: INTERNAL MEDICINE CLINIC | Age: 30
End: 2020-06-26

## 2020-06-26 DIAGNOSIS — F41.9 ANXIETY: Primary | ICD-10-CM

## 2020-06-29 DIAGNOSIS — F41.9 ANXIETY: Primary | ICD-10-CM

## 2020-06-29 RX ORDER — ESCITALOPRAM OXALATE 5 MG/1
5 TABLET ORAL DAILY
Qty: 30 TAB | Refills: 2 | Status: SHIPPED | OUTPATIENT
Start: 2020-06-29 | End: 2020-07-23 | Stop reason: SDUPTHER

## 2020-06-29 NOTE — TELEPHONE ENCOUNTER
Darling Ariza (1990) initiated an asynchronous digital communication through [address] [place] [address]. HPI: per patient questionnaire     Exam: not applicable    Diagnoses and all orders for this visit:  Diagnoses and all orders for this visit:    1. Anxiety    Lexapro sent to pharmacy on file. Zoloft to be stopped. Time: EV2 - 11-20 minutes were spent on the digital evaluation and management of this patient.        Jose Dears, NP

## 2020-07-14 DIAGNOSIS — F90.0 ATTENTION DEFICIT HYPERACTIVITY DISORDER (ADHD), PREDOMINANTLY INATTENTIVE TYPE: ICD-10-CM

## 2020-07-14 RX ORDER — DEXTROAMPHETAMINE SACCHARATE, AMPHETAMINE ASPARTATE, DEXTROAMPHETAMINE SULFATE AND AMPHETAMINE SULFATE 2.5; 2.5; 2.5; 2.5 MG/1; MG/1; MG/1; MG/1
10 TABLET ORAL DAILY
Qty: 30 TAB | Refills: 0 | Status: SHIPPED | OUTPATIENT
Start: 2020-07-14 | End: 2020-07-27 | Stop reason: SDUPTHER

## 2020-07-14 RX ORDER — DEXTROAMPHETAMINE SACCHARATE, AMPHETAMINE ASPARTATE MONOHYDRATE, DEXTROAMPHETAMINE SULFATE AND AMPHETAMINE SULFATE 5; 5; 5; 5 MG/1; MG/1; MG/1; MG/1
20 CAPSULE, EXTENDED RELEASE ORAL
Qty: 30 CAP | Refills: 0 | Status: SHIPPED | OUTPATIENT
Start: 2020-07-14 | End: 2020-07-27 | Stop reason: SDUPTHER

## 2020-07-23 DIAGNOSIS — F41.9 ANXIETY: ICD-10-CM

## 2020-07-23 RX ORDER — ESCITALOPRAM OXALATE 5 MG/1
5 TABLET ORAL DAILY
Qty: 90 TAB | Refills: 2 | Status: SHIPPED | OUTPATIENT
Start: 2020-07-23

## 2020-07-27 DIAGNOSIS — F90.0 ATTENTION DEFICIT HYPERACTIVITY DISORDER (ADHD), PREDOMINANTLY INATTENTIVE TYPE: ICD-10-CM

## 2020-07-27 RX ORDER — DEXTROAMPHETAMINE SACCHARATE, AMPHETAMINE ASPARTATE MONOHYDRATE, DEXTROAMPHETAMINE SULFATE AND AMPHETAMINE SULFATE 5; 5; 5; 5 MG/1; MG/1; MG/1; MG/1
20 CAPSULE, EXTENDED RELEASE ORAL
Qty: 30 CAP | Refills: 0 | Status: SHIPPED | OUTPATIENT
Start: 2020-07-27

## 2020-07-27 RX ORDER — DEXTROAMPHETAMINE SACCHARATE, AMPHETAMINE ASPARTATE, DEXTROAMPHETAMINE SULFATE AND AMPHETAMINE SULFATE 2.5; 2.5; 2.5; 2.5 MG/1; MG/1; MG/1; MG/1
10 TABLET ORAL DAILY
Qty: 30 TAB | Refills: 0 | Status: SHIPPED | OUTPATIENT
Start: 2020-07-27

## 2022-03-18 PROBLEM — F90.9 ADHD: Status: ACTIVE | Noted: 2018-06-25

## 2023-05-21 RX ORDER — DEXTROAMPHETAMINE SACCHARATE, AMPHETAMINE ASPARTATE MONOHYDRATE, DEXTROAMPHETAMINE SULFATE AND AMPHETAMINE SULFATE 5; 5; 5; 5 MG/1; MG/1; MG/1; MG/1
20 CAPSULE, EXTENDED RELEASE ORAL
COMMUNITY
Start: 2020-07-27

## 2023-05-21 RX ORDER — DEXTROAMPHETAMINE SACCHARATE, AMPHETAMINE ASPARTATE, DEXTROAMPHETAMINE SULFATE AND AMPHETAMINE SULFATE 2.5; 2.5; 2.5; 2.5 MG/1; MG/1; MG/1; MG/1
10 TABLET ORAL DAILY
COMMUNITY
Start: 2020-07-27

## 2023-05-21 RX ORDER — ESCITALOPRAM OXALATE 5 MG/1
5 TABLET ORAL DAILY
COMMUNITY
Start: 2020-07-23

## 2023-05-25 ENCOUNTER — APPOINTMENT (RX ONLY)
Dept: URBAN - METROPOLITAN AREA CLINIC 151 | Facility: CLINIC | Age: 33
Setting detail: DERMATOLOGY
End: 2023-05-25

## 2023-05-25 DIAGNOSIS — D22 MELANOCYTIC NEVI: ICD-10-CM

## 2023-05-25 DIAGNOSIS — L21.8 OTHER SEBORRHEIC DERMATITIS: ICD-10-CM

## 2023-05-25 PROBLEM — D22.61 MELANOCYTIC NEVI OF RIGHT UPPER LIMB, INCLUDING SHOULDER: Status: ACTIVE | Noted: 2023-05-25

## 2023-05-25 PROBLEM — D22.5 MELANOCYTIC NEVI OF TRUNK: Status: ACTIVE | Noted: 2023-05-25

## 2023-05-25 PROBLEM — D23.9 OTHER BENIGN NEOPLASM OF SKIN, UNSPECIFIED: Status: ACTIVE | Noted: 2023-05-25

## 2023-05-25 PROCEDURE — ? COUNSELING

## 2023-05-25 PROCEDURE — ? PRESCRIPTION

## 2023-05-25 PROCEDURE — ? PRESCRIPTION MEDICATION MANAGEMENT

## 2023-05-25 PROCEDURE — 99203 OFFICE O/P NEW LOW 30 MIN: CPT

## 2023-05-25 PROCEDURE — ? DIAGNOSIS COMMENT

## 2023-05-25 RX ORDER — FLUOCINOLONE ACETONIDE 0.1 MG/ML
SOLUTION TOPICAL
Qty: 60 | Refills: 2 | Status: ERX | COMMUNITY
Start: 2023-05-25

## 2023-05-25 RX ADMIN — FLUOCINOLONE ACETONIDE: 0.1 SOLUTION TOPICAL at 00:00

## 2023-05-25 ASSESSMENT — LOCATION ZONE DERM
LOCATION ZONE: TRUNK
LOCATION ZONE: ARM

## 2023-05-25 ASSESSMENT — LOCATION SIMPLE DESCRIPTION DERM
LOCATION SIMPLE: RIGHT FOREARM
LOCATION SIMPLE: LEFT UPPER BACK

## 2023-05-25 ASSESSMENT — LOCATION DETAILED DESCRIPTION DERM
LOCATION DETAILED: RIGHT PROXIMAL DORSAL FOREARM
LOCATION DETAILED: LEFT MEDIAL UPPER BACK

## 2023-05-25 NOTE — HPI: OTHER
Condition:: FBSE
Please Describe Your Condition:: Pt states her scalp has been sporadically itchy and flaking for about the past month. She used selsun blue to treat.

## 2023-05-25 NOTE — PROCEDURE: PRESCRIPTION MEDICATION MANAGEMENT
Render In Strict Bullet Format?: No
Continue Regimen: Selsun blue shampoo 2-3x per week x 3 min before rinsing out
Detail Level: Zone
Initiate Treatment: fluocinolone 0.01 % topical solution apply to affected area on the scalp twice daily as needed for a maximum of 2 weeks at a time, then decrease to as needed only for maintenance.

## 2023-06-02 ENCOUNTER — RX ONLY (OUTPATIENT)
Age: 33
Setting detail: RX ONLY
End: 2023-06-02

## 2023-06-02 RX ORDER — TRETIONIN 0.25 MG/G
CREAM TOPICAL
Qty: 45 | Refills: 3 | Status: ERX | COMMUNITY
Start: 2023-06-02